# Patient Record
Sex: MALE | Race: WHITE | NOT HISPANIC OR LATINO | Employment: FULL TIME | URBAN - METROPOLITAN AREA
[De-identification: names, ages, dates, MRNs, and addresses within clinical notes are randomized per-mention and may not be internally consistent; named-entity substitution may affect disease eponyms.]

---

## 2023-04-10 ENCOUNTER — LAB (OUTPATIENT)
Dept: LAB | Facility: LAB | Age: 63
End: 2023-04-10
Payer: COMMERCIAL

## 2023-04-10 ENCOUNTER — OFFICE VISIT (OUTPATIENT)
Dept: PRIMARY CARE | Facility: CLINIC | Age: 63
End: 2023-04-10
Payer: COMMERCIAL

## 2023-04-10 VITALS
SYSTOLIC BLOOD PRESSURE: 140 MMHG | RESPIRATION RATE: 16 BRPM | WEIGHT: 275.8 LBS | TEMPERATURE: 97.5 F | OXYGEN SATURATION: 95 % | DIASTOLIC BLOOD PRESSURE: 76 MMHG | BODY MASS INDEX: 41.94 KG/M2 | HEART RATE: 69 BPM

## 2023-04-10 DIAGNOSIS — R33.8 BENIGN PROSTATIC HYPERPLASIA WITH URINARY RETENTION: ICD-10-CM

## 2023-04-10 DIAGNOSIS — I10 BENIGN ESSENTIAL HYPERTENSION: Primary | ICD-10-CM

## 2023-04-10 DIAGNOSIS — N40.1 BENIGN PROSTATIC HYPERPLASIA WITH URINARY RETENTION: ICD-10-CM

## 2023-04-10 DIAGNOSIS — E11.9 TYPE 2 DIABETES MELLITUS WITHOUT COMPLICATION, WITHOUT LONG-TERM CURRENT USE OF INSULIN (MULTI): ICD-10-CM

## 2023-04-10 PROBLEM — Z86.72 HISTORY OF DEEP VEIN THROMBOPHLEBITIS OF LOWER EXTREMITY: Status: ACTIVE | Noted: 2023-04-10

## 2023-04-10 PROBLEM — N52.9 MALE ERECTILE DISORDER: Status: ACTIVE | Noted: 2023-04-10

## 2023-04-10 PROBLEM — E66.01 MORBID OBESITY WITH BMI OF 40.0-44.9, ADULT (MULTI): Status: ACTIVE | Noted: 2023-04-10

## 2023-04-10 PROBLEM — R73.09 ELEVATED GLUCOSE LEVEL: Status: ACTIVE | Noted: 2023-04-10

## 2023-04-10 PROBLEM — R39.198 OTHER DIFFICULTIES WITH MICTURITION: Status: ACTIVE | Noted: 2023-04-10

## 2023-04-10 PROBLEM — R00.2 PALPITATIONS: Status: ACTIVE | Noted: 2023-04-10

## 2023-04-10 LAB
POC HEMOGLOBIN A1C: 6.2 % (ref 4.2–6.5)
PROSTATE SPECIFIC AG (NG/ML) IN SER/PLAS: 1.72 NG/ML (ref 0–4)

## 2023-04-10 PROCEDURE — 83036 HEMOGLOBIN GLYCOSYLATED A1C: CPT | Performed by: INTERNAL MEDICINE

## 2023-04-10 PROCEDURE — 1036F TOBACCO NON-USER: CPT | Performed by: INTERNAL MEDICINE

## 2023-04-10 PROCEDURE — 84153 ASSAY OF PSA TOTAL: CPT

## 2023-04-10 PROCEDURE — 3077F SYST BP >= 140 MM HG: CPT | Performed by: INTERNAL MEDICINE

## 2023-04-10 PROCEDURE — 3078F DIAST BP <80 MM HG: CPT | Performed by: INTERNAL MEDICINE

## 2023-04-10 PROCEDURE — 36415 COLL VENOUS BLD VENIPUNCTURE: CPT

## 2023-04-10 PROCEDURE — 99214 OFFICE O/P EST MOD 30 MIN: CPT | Performed by: INTERNAL MEDICINE

## 2023-04-10 RX ORDER — CHOLECALCIFEROL (VITAMIN D3) 125 MCG
CAPSULE ORAL
COMMUNITY
Start: 2021-08-26

## 2023-04-10 RX ORDER — TAMSULOSIN HYDROCHLORIDE 0.4 MG/1
0.4 CAPSULE ORAL NIGHTLY
COMMUNITY

## 2023-04-10 RX ORDER — OLMESARTAN MEDOXOMIL AND HYDROCHLOROTHIAZIDE 40/25 40; 25 MG/1; MG/1
1 TABLET ORAL DAILY
COMMUNITY
End: 2023-04-13

## 2023-04-10 RX ORDER — ASCORBIC ACID 500 MG
2 TABLET ORAL DAILY
COMMUNITY
Start: 2018-08-09

## 2023-04-10 RX ORDER — METOPROLOL SUCCINATE 50 MG/1
50 TABLET, EXTENDED RELEASE ORAL NIGHTLY
COMMUNITY
End: 2023-04-13

## 2023-04-10 RX ORDER — METFORMIN HYDROCHLORIDE 500 MG/1
500 TABLET ORAL
COMMUNITY
End: 2023-04-17

## 2023-04-10 RX ORDER — ATORVASTATIN CALCIUM 10 MG/1
1 TABLET, FILM COATED ORAL DAILY
COMMUNITY
Start: 2022-10-07 | End: 2023-10-09 | Stop reason: SDUPTHER

## 2023-04-10 ASSESSMENT — PATIENT HEALTH QUESTIONNAIRE - PHQ9
SUM OF ALL RESPONSES TO PHQ9 QUESTIONS 1 AND 2: 0
1. LITTLE INTEREST OR PLEASURE IN DOING THINGS: NOT AT ALL
2. FEELING DOWN, DEPRESSED OR HOPELESS: NOT AT ALL

## 2023-04-10 ASSESSMENT — ENCOUNTER SYMPTOMS
ACTIVITY CHANGE: 0
APPETITE CHANGE: 0
SHORTNESS OF BREATH: 0
COUGH: 0
PALPITATIONS: 0

## 2023-04-10 NOTE — PROGRESS NOTES
The patient is here today for a follow up appointment.Subjective   Patient ID: Juan J Gold is a 63 y.o. male who presents for Follow-up.  Here for follow up  Saw Diabetes Educator 3 times  Weight is up 2-3 pounds  Not really exercising - has a treadmill that he does not enjoy    Going to [place] at the end of the month    Worried about PSA 3 - juan diego a test this month before seeing urology      Review of Systems   Constitutional:  Negative for activity change and appetite change.   Respiratory:  Negative for cough and shortness of breath.    Cardiovascular:  Negative for chest pain, palpitations and leg swelling.       Objective   Physical Exam  Vitals and nursing note reviewed.   Cardiovascular:      Rate and Rhythm: Normal rate and regular rhythm.      Heart sounds: Normal heart sounds.   Pulmonary:      Effort: Pulmonary effort is normal.      Breath sounds: Normal breath sounds.   Musculoskeletal:      Cervical back: Normal range of motion.   Neurological:      Mental Status: He is alert.       Assessment/Plan   Problem List Items Addressed This Visit       Benign essential hypertension - Primary    Current Assessment & Plan     A little higher today  Will monitor         Type 2 diabetes mellitus without complication, without long-term current use of insulin (CMS/[facility])    Current Assessment & Plan     Stable  Needs Prevnar 20 -  wants to hold off         Enlarged prostate with lower urinary tract symptoms (LUTS)    Current Assessment & Plan     Seeing Urology next month

## 2023-04-13 DIAGNOSIS — I10 BENIGN ESSENTIAL HYPERTENSION: ICD-10-CM

## 2023-04-13 RX ORDER — METOPROLOL SUCCINATE 50 MG/1
TABLET, EXTENDED RELEASE ORAL
Qty: 90 TABLET | Refills: 1 | Status: SHIPPED | OUTPATIENT
Start: 2023-04-13 | End: 2023-10-30 | Stop reason: SDUPTHER

## 2023-04-13 RX ORDER — OLMESARTAN MEDOXOMIL AND HYDROCHLOROTHIAZIDE 40/25 40; 25 MG/1; MG/1
TABLET ORAL
Qty: 90 TABLET | Refills: 1 | Status: SHIPPED | OUTPATIENT
Start: 2023-04-13 | End: 2023-10-30 | Stop reason: SDUPTHER

## 2023-04-17 DIAGNOSIS — E11.9 TYPE 2 DIABETES MELLITUS WITHOUT COMPLICATION, WITHOUT LONG-TERM CURRENT USE OF INSULIN (MULTI): Primary | ICD-10-CM

## 2023-04-17 RX ORDER — METFORMIN HYDROCHLORIDE 500 MG/1
TABLET ORAL
Qty: 180 TABLET | Refills: 0 | Status: SHIPPED | OUTPATIENT
Start: 2023-04-17 | End: 2023-07-31

## 2023-06-28 PROBLEM — E11.9 DIABETES MELLITUS (MULTI): Status: ACTIVE | Noted: 2023-06-28

## 2023-07-10 ENCOUNTER — OFFICE VISIT (OUTPATIENT)
Dept: PRIMARY CARE | Facility: CLINIC | Age: 63
End: 2023-07-10
Payer: COMMERCIAL

## 2023-07-10 VITALS
WEIGHT: 277.2 LBS | HEART RATE: 69 BPM | OXYGEN SATURATION: 94 % | BODY MASS INDEX: 42.15 KG/M2 | SYSTOLIC BLOOD PRESSURE: 138 MMHG | DIASTOLIC BLOOD PRESSURE: 70 MMHG | RESPIRATION RATE: 16 BRPM | TEMPERATURE: 97.1 F

## 2023-07-10 DIAGNOSIS — E66.01 MORBID OBESITY WITH BMI OF 40.0-44.9, ADULT (MULTI): ICD-10-CM

## 2023-07-10 DIAGNOSIS — E11.9 TYPE 2 DIABETES MELLITUS WITHOUT COMPLICATION, WITHOUT LONG-TERM CURRENT USE OF INSULIN (MULTI): ICD-10-CM

## 2023-07-10 DIAGNOSIS — I10 BENIGN ESSENTIAL HYPERTENSION: Primary | ICD-10-CM

## 2023-07-10 DIAGNOSIS — Z00.00 HEALTH MAINTENANCE EXAMINATION: ICD-10-CM

## 2023-07-10 DIAGNOSIS — Z00.00 HEALTHCARE MAINTENANCE: ICD-10-CM

## 2023-07-10 LAB — POC HEMOGLOBIN A1C: 6 % (ref 4.2–6.5)

## 2023-07-10 PROCEDURE — 1036F TOBACCO NON-USER: CPT | Performed by: INTERNAL MEDICINE

## 2023-07-10 PROCEDURE — 99214 OFFICE O/P EST MOD 30 MIN: CPT | Performed by: INTERNAL MEDICINE

## 2023-07-10 PROCEDURE — 3008F BODY MASS INDEX DOCD: CPT | Performed by: INTERNAL MEDICINE

## 2023-07-10 PROCEDURE — 3075F SYST BP GE 130 - 139MM HG: CPT | Performed by: INTERNAL MEDICINE

## 2023-07-10 PROCEDURE — 3078F DIAST BP <80 MM HG: CPT | Performed by: INTERNAL MEDICINE

## 2023-07-10 PROCEDURE — 83036 HEMOGLOBIN GLYCOSYLATED A1C: CPT | Performed by: INTERNAL MEDICINE

## 2023-07-10 ASSESSMENT — ENCOUNTER SYMPTOMS
ACTIVITY CHANGE: 0
COUGH: 0
SHORTNESS OF BREATH: 0
APPETITE CHANGE: 0
PALPITATIONS: 0

## 2023-07-10 NOTE — PROGRESS NOTES
The patient is here today for a follow up appointment.Subjective   Patient ID: Juan J Gold is a 63 y.o. male who presents for Follow-up.  Here for follow up  Was on vacation for 3 weeks   Only gained 2 lbs    Has improved his diet  Making better choices    Had a colonoscopy few weeks ago    Granddaughter getting  this weekend in [place]          Review of Systems   Constitutional:  Negative for activity change and appetite change.   Respiratory:  Negative for cough and shortness of breath.    Cardiovascular:  Negative for chest pain, palpitations and leg swelling.       Objective   Physical Exam  Vitals and nursing note reviewed.   Cardiovascular:      Rate and Rhythm: Normal rate and regular rhythm.      Heart sounds: Normal heart sounds.   Pulmonary:      Effort: Pulmonary effort is normal.      Breath sounds: Normal breath sounds.   Musculoskeletal:      Cervical back: Normal range of motion.   Neurological:      Mental Status: He is alert.         Assessment/Plan   Problem List Items Addressed This Visit       Benign essential hypertension - Primary    Current Assessment & Plan     stable         Type 2 diabetes mellitus without complication, without long-term current use of insulin (CMS/[facility])    Current Assessment & Plan     Improved         Morbid obesity with BMI of 40.0-44.9, adult (CMS/[facility])    Current Assessment & Plan     Encouraged to refocus         Follow up with me in 3 months

## 2023-07-29 DIAGNOSIS — E11.9 TYPE 2 DIABETES MELLITUS WITHOUT COMPLICATION, WITHOUT LONG-TERM CURRENT USE OF INSULIN (MULTI): ICD-10-CM

## 2023-07-31 RX ORDER — METFORMIN HYDROCHLORIDE 500 MG/1
500 TABLET ORAL
Qty: 180 TABLET | Refills: 0 | Status: SHIPPED | OUTPATIENT
Start: 2023-07-31 | End: 2023-10-30 | Stop reason: SDUPTHER

## 2023-10-09 DIAGNOSIS — E11.9 TYPE 2 DIABETES MELLITUS WITHOUT COMPLICATION, WITHOUT LONG-TERM CURRENT USE OF INSULIN (MULTI): ICD-10-CM

## 2023-10-09 RX ORDER — ATORVASTATIN CALCIUM 10 MG/1
10 TABLET, FILM COATED ORAL DAILY
Qty: 90 TABLET | Refills: 1 | Status: SHIPPED | OUTPATIENT
Start: 2023-10-09 | End: 2024-04-05

## 2023-10-28 ENCOUNTER — LAB (OUTPATIENT)
Dept: LAB | Facility: LAB | Age: 63
End: 2023-10-28
Payer: COMMERCIAL

## 2023-10-28 DIAGNOSIS — Z00.00 HEALTHCARE MAINTENANCE: ICD-10-CM

## 2023-10-28 DIAGNOSIS — E11.9 TYPE 2 DIABETES MELLITUS WITHOUT COMPLICATION, WITHOUT LONG-TERM CURRENT USE OF INSULIN (MULTI): ICD-10-CM

## 2023-10-28 DIAGNOSIS — Z00.00 HEALTH MAINTENANCE EXAMINATION: ICD-10-CM

## 2023-10-28 LAB
ALBUMIN SERPL BCP-MCNC: 4 G/DL (ref 3.4–5)
ALP SERPL-CCNC: 100 U/L (ref 33–136)
ALT SERPL W P-5'-P-CCNC: 18 U/L (ref 10–52)
ANION GAP SERPL CALC-SCNC: 12 MMOL/L (ref 10–20)
AST SERPL W P-5'-P-CCNC: 16 U/L (ref 9–39)
BILIRUB SERPL-MCNC: 0.7 MG/DL (ref 0–1.2)
BUN SERPL-MCNC: 25 MG/DL (ref 6–23)
CALCIUM SERPL-MCNC: 8.9 MG/DL (ref 8.6–10.3)
CHLORIDE SERPL-SCNC: 103 MMOL/L (ref 98–107)
CHOLEST SERPL-MCNC: 125 MG/DL (ref 0–199)
CHOLESTEROL/HDL RATIO: 2.8
CO2 SERPL-SCNC: 29 MMOL/L (ref 21–32)
CREAT SERPL-MCNC: 1.07 MG/DL (ref 0.5–1.3)
ERYTHROCYTE [DISTWIDTH] IN BLOOD BY AUTOMATED COUNT: 12.2 % (ref 11.5–14.5)
EST. AVERAGE GLUCOSE BLD GHB EST-MCNC: 134 MG/DL
GFR SERPL CREATININE-BSD FRML MDRD: 78 ML/MIN/1.73M*2
GLUCOSE SERPL-MCNC: 131 MG/DL (ref 74–99)
HBA1C MFR BLD: 6.3 %
HCT VFR BLD AUTO: 43.9 % (ref 41–52)
HDLC SERPL-MCNC: 45.3 MG/DL
HGB BLD-MCNC: 15 G/DL (ref 13.5–17.5)
LDLC SERPL CALC-MCNC: 65 MG/DL
MCH RBC QN AUTO: 33.3 PG (ref 26–34)
MCHC RBC AUTO-ENTMCNC: 34.2 G/DL (ref 32–36)
MCV RBC AUTO: 97 FL (ref 80–100)
NON HDL CHOLESTEROL: 80 MG/DL (ref 0–149)
NRBC BLD-RTO: 0 /100 WBCS (ref 0–0)
PLATELET # BLD AUTO: 263 X10*3/UL (ref 150–450)
PMV BLD AUTO: 9.6 FL (ref 7.5–11.5)
POTASSIUM SERPL-SCNC: 4.2 MMOL/L (ref 3.5–5.3)
PROT SERPL-MCNC: 6.5 G/DL (ref 6.4–8.2)
RBC # BLD AUTO: 4.51 X10*6/UL (ref 4.5–5.9)
SODIUM SERPL-SCNC: 140 MMOL/L (ref 136–145)
TRIGL SERPL-MCNC: 75 MG/DL (ref 0–149)
TSH SERPL-ACNC: 2.23 MIU/L (ref 0.44–3.98)
VLDL: 15 MG/DL (ref 0–40)
WBC # BLD AUTO: 7.2 X10*3/UL (ref 4.4–11.3)

## 2023-10-28 PROCEDURE — 80053 COMPREHEN METABOLIC PANEL: CPT

## 2023-10-28 PROCEDURE — 84443 ASSAY THYROID STIM HORMONE: CPT

## 2023-10-28 PROCEDURE — 83036 HEMOGLOBIN GLYCOSYLATED A1C: CPT

## 2023-10-28 PROCEDURE — 85027 COMPLETE CBC AUTOMATED: CPT

## 2023-10-28 PROCEDURE — 36415 COLL VENOUS BLD VENIPUNCTURE: CPT

## 2023-10-28 PROCEDURE — 80061 LIPID PANEL: CPT

## 2023-10-30 ENCOUNTER — OFFICE VISIT (OUTPATIENT)
Dept: PRIMARY CARE | Facility: CLINIC | Age: 63
End: 2023-10-30
Payer: COMMERCIAL

## 2023-10-30 VITALS
SYSTOLIC BLOOD PRESSURE: 137 MMHG | OXYGEN SATURATION: 95 % | BODY MASS INDEX: 42.25 KG/M2 | HEIGHT: 68 IN | TEMPERATURE: 97.2 F | WEIGHT: 278.8 LBS | HEART RATE: 63 BPM | DIASTOLIC BLOOD PRESSURE: 75 MMHG | RESPIRATION RATE: 16 BRPM

## 2023-10-30 DIAGNOSIS — Z23 ENCOUNTER FOR IMMUNIZATION: ICD-10-CM

## 2023-10-30 DIAGNOSIS — Z00.00 HEALTH CARE MAINTENANCE: ICD-10-CM

## 2023-10-30 DIAGNOSIS — I10 BENIGN ESSENTIAL HYPERTENSION: ICD-10-CM

## 2023-10-30 DIAGNOSIS — E11.9 TYPE 2 DIABETES MELLITUS WITHOUT COMPLICATION, WITHOUT LONG-TERM CURRENT USE OF INSULIN (MULTI): ICD-10-CM

## 2023-10-30 DIAGNOSIS — E66.01 MORBID OBESITY WITH BMI OF 40.0-44.9, ADULT (MULTI): Primary | ICD-10-CM

## 2023-10-30 PROCEDURE — 99396 PREV VISIT EST AGE 40-64: CPT | Performed by: INTERNAL MEDICINE

## 2023-10-30 PROCEDURE — 3075F SYST BP GE 130 - 139MM HG: CPT | Performed by: INTERNAL MEDICINE

## 2023-10-30 PROCEDURE — 3078F DIAST BP <80 MM HG: CPT | Performed by: INTERNAL MEDICINE

## 2023-10-30 PROCEDURE — 90686 IIV4 VACC NO PRSV 0.5 ML IM: CPT | Performed by: INTERNAL MEDICINE

## 2023-10-30 PROCEDURE — 3044F HG A1C LEVEL LT 7.0%: CPT | Performed by: INTERNAL MEDICINE

## 2023-10-30 PROCEDURE — 1036F TOBACCO NON-USER: CPT | Performed by: INTERNAL MEDICINE

## 2023-10-30 PROCEDURE — 3048F LDL-C <100 MG/DL: CPT | Performed by: INTERNAL MEDICINE

## 2023-10-30 PROCEDURE — 93000 ELECTROCARDIOGRAM COMPLETE: CPT | Performed by: INTERNAL MEDICINE

## 2023-10-30 PROCEDURE — 3008F BODY MASS INDEX DOCD: CPT | Performed by: INTERNAL MEDICINE

## 2023-10-30 PROCEDURE — 90471 IMMUNIZATION ADMIN: CPT | Performed by: INTERNAL MEDICINE

## 2023-10-30 RX ORDER — OLMESARTAN MEDOXOMIL AND HYDROCHLOROTHIAZIDE 40/25 40; 25 MG/1; MG/1
1 TABLET ORAL DAILY
Qty: 90 TABLET | Refills: 1 | Status: SHIPPED | OUTPATIENT
Start: 2023-10-30 | End: 2024-04-24

## 2023-10-30 RX ORDER — METFORMIN HYDROCHLORIDE 500 MG/1
500 TABLET ORAL
Qty: 180 TABLET | Refills: 1 | Status: SHIPPED | OUTPATIENT
Start: 2023-10-30 | End: 2024-04-24

## 2023-10-30 RX ORDER — METOPROLOL SUCCINATE 50 MG/1
50 TABLET, EXTENDED RELEASE ORAL NIGHTLY
Qty: 90 TABLET | Refills: 1 | Status: SHIPPED | OUTPATIENT
Start: 2023-10-30 | End: 2024-04-24

## 2023-10-30 ASSESSMENT — ENCOUNTER SYMPTOMS
SORE THROAT: 0
APPETITE CHANGE: 0
ACTIVITY CHANGE: 0
DYSURIA: 0
CONSTIPATION: 0
DIARRHEA: 0
HEADACHES: 0
TREMORS: 0
ABDOMINAL PAIN: 0
BLOOD IN STOOL: 0
RHINORRHEA: 0
TROUBLE SWALLOWING: 0
VOICE CHANGE: 0
UNEXPECTED WEIGHT CHANGE: 0
DIZZINESS: 0
ARTHRALGIAS: 0
FATIGUE: 0
SHORTNESS OF BREATH: 0
CHEST TIGHTNESS: 0
COUGH: 0
PALPITATIONS: 0

## 2023-10-30 NOTE — ASSESSMENT & PLAN NOTE
Discussed Mounjaro - not interested in injectibles  Continue Metformin  Wants to work on lifestyle

## 2023-10-30 NOTE — PROGRESS NOTES
The patient is here today for a physical exam.Subjective   Patient ID: Juan J Gold is a 63 y.o. male who presents for Annual Exam.  62 yo male here for a physical  Feels well  No complaints    Has been working more - not eating well           Review of Systems   Constitutional:  Negative for activity change, appetite change, fatigue and unexpected weight change.   HENT:  Negative for ear pain, hearing loss, rhinorrhea, sore throat, trouble swallowing and voice change.    Eyes:  Negative for visual disturbance.   Respiratory:  Negative for cough, chest tightness and shortness of breath.    Cardiovascular:  Negative for chest pain, palpitations and leg swelling.   Gastrointestinal:  Negative for abdominal pain, blood in stool, constipation and diarrhea.   Genitourinary:  Negative for dysuria, scrotal swelling and testicular pain.   Musculoskeletal:  Negative for arthralgias.   Skin:  Negative for rash.   Neurological:  Negative for dizziness, tremors, syncope and headaches.       Objective   Physical Exam  Constitutional:       General: He is not in acute distress.     Appearance: He is well-developed. He is not diaphoretic.   HENT:      Head: Normocephalic.      Right Ear: Tympanic membrane normal. There is no impacted cerumen.      Left Ear: Tympanic membrane normal. There is no impacted cerumen.      Nose: Nose normal.      Mouth/Throat:      Mouth: Mucous membranes are moist.      Pharynx: Oropharynx is clear. No oropharyngeal exudate or posterior oropharyngeal erythema.   Eyes:      General: No scleral icterus.     Extraocular Movements: Extraocular movements intact.      Conjunctiva/sclera: Conjunctivae normal.      Pupils: Pupils are equal, round, and reactive to light.   Neck:      Thyroid: No thyromegaly.      Vascular: No JVD.   Cardiovascular:      Rate and Rhythm: Normal rate and regular rhythm.      Pulses: Normal pulses.      Heart sounds: Normal heart sounds. No murmur heard.     No friction rub. No  gallop.   Pulmonary:      Effort: Pulmonary effort is normal. No respiratory distress.      Breath sounds: Normal breath sounds. No wheezing or rales.   Chest:      Chest wall: No tenderness.   Abdominal:      General: Bowel sounds are normal. There is no distension.      Palpations: Abdomen is soft. There is no mass.      Tenderness: There is no abdominal tenderness. There is no rebound.   Musculoskeletal:         General: Normal range of motion.      Cervical back: Normal range of motion and neck supple.   Lymphadenopathy:      Cervical: No cervical adenopathy.   Skin:     General: Skin is warm and dry.   Neurological:      General: No focal deficit present.      Mental Status: He is alert and oriented to person, place, and time.      Deep Tendon Reflexes: Reflexes normal.   Psychiatric:         Mood and Affect: Mood normal.         Thought Content: Thought content normal.         Assessment/Plan   Problem List Items Addressed This Visit       Benign essential hypertension    Current Assessment & Plan     Stable         Relevant Medications    metoprolol succinate XL (Toprol-XL) 50 mg 24 hr tablet    olmesartan-hydrochlorothiazide (BENIcar HCT) 40-25 mg tablet    Type 2 diabetes mellitus without complication, without long-term current use of insulin (CMS/[facility])    Current Assessment & Plan     Discussed Mounjaro - not interested in injectibles  Continue Metformin  Wants to work on lifestyle         Relevant Medications    metFORMIN (Glucophage) 500 mg tablet    Morbid obesity with BMI of 40.0-44.9, adult (CMS/[facility]) - Primary    Current Assessment & Plan     Discussed diet and exercise          Other Visit Diagnoses       Encounter for immunization        Relevant Orders    Flu vaccine (IIV4) age 6 months and greater, preservative free (Completed)    Health care maintenance        Relevant Orders    ECG 12 lead (Clinic Performed) (Completed)         Recommend shingrix - will think about it

## 2023-11-20 RX ORDER — TAMSULOSIN HYDROCHLORIDE 0.4 MG/1
0.4 CAPSULE ORAL NIGHTLY
Qty: 90 CAPSULE | Refills: 0 | OUTPATIENT
Start: 2023-11-20

## 2024-02-08 ENCOUNTER — APPOINTMENT (OUTPATIENT)
Dept: PRIMARY CARE | Facility: CLINIC | Age: 64
End: 2024-02-08
Payer: COMMERCIAL

## 2024-03-26 ENCOUNTER — TELEPHONE (OUTPATIENT)
Dept: UROLOGY | Facility: CLINIC | Age: 64
End: 2024-03-26
Payer: COMMERCIAL

## 2024-03-26 DIAGNOSIS — R35.0 BENIGN PROSTATIC HYPERPLASIA WITH URINARY FREQUENCY: Primary | ICD-10-CM

## 2024-03-26 DIAGNOSIS — N40.1 BENIGN PROSTATIC HYPERPLASIA WITH URINARY FREQUENCY: Primary | ICD-10-CM

## 2024-03-26 RX ORDER — TADALAFIL 5 MG/1
5 TABLET ORAL DAILY
Qty: 90 TABLET | Refills: 3 | Status: SHIPPED | OUTPATIENT
Start: 2024-03-26 | End: 2025-03-26

## 2024-04-05 DIAGNOSIS — E11.9 TYPE 2 DIABETES MELLITUS WITHOUT COMPLICATION, WITHOUT LONG-TERM CURRENT USE OF INSULIN (MULTI): ICD-10-CM

## 2024-04-05 RX ORDER — ATORVASTATIN CALCIUM 10 MG/1
10 TABLET, FILM COATED ORAL DAILY
Qty: 90 TABLET | Refills: 0 | Status: SHIPPED | OUTPATIENT
Start: 2024-04-05

## 2024-04-24 DIAGNOSIS — I10 BENIGN ESSENTIAL HYPERTENSION: ICD-10-CM

## 2024-04-24 DIAGNOSIS — E11.9 TYPE 2 DIABETES MELLITUS WITHOUT COMPLICATION, WITHOUT LONG-TERM CURRENT USE OF INSULIN (MULTI): ICD-10-CM

## 2024-04-24 RX ORDER — METFORMIN HYDROCHLORIDE 500 MG/1
500 TABLET ORAL
Qty: 180 TABLET | Refills: 0 | Status: SHIPPED | OUTPATIENT
Start: 2024-04-24

## 2024-04-24 RX ORDER — OLMESARTAN MEDOXOMIL AND HYDROCHLOROTHIAZIDE 40/25 40; 25 MG/1; MG/1
1 TABLET ORAL DAILY
Qty: 90 TABLET | Refills: 0 | Status: SHIPPED | OUTPATIENT
Start: 2024-04-24

## 2024-04-24 RX ORDER — METOPROLOL SUCCINATE 50 MG/1
50 TABLET, EXTENDED RELEASE ORAL DAILY
Qty: 90 TABLET | Refills: 0 | Status: SHIPPED | OUTPATIENT
Start: 2024-04-24

## 2024-05-08 ENCOUNTER — OFFICE VISIT (OUTPATIENT)
Dept: PRIMARY CARE | Facility: CLINIC | Age: 64
End: 2024-05-08
Payer: COMMERCIAL

## 2024-05-08 VITALS
HEIGHT: 68 IN | OXYGEN SATURATION: 95 % | TEMPERATURE: 97.2 F | BODY MASS INDEX: 42.86 KG/M2 | HEART RATE: 66 BPM | SYSTOLIC BLOOD PRESSURE: 132 MMHG | DIASTOLIC BLOOD PRESSURE: 78 MMHG | WEIGHT: 282.8 LBS

## 2024-05-08 DIAGNOSIS — I10 BENIGN ESSENTIAL HYPERTENSION: Primary | ICD-10-CM

## 2024-05-08 DIAGNOSIS — E11.9 TYPE 2 DIABETES MELLITUS WITHOUT COMPLICATION, WITHOUT LONG-TERM CURRENT USE OF INSULIN (MULTI): ICD-10-CM

## 2024-05-08 DIAGNOSIS — Z00.00 ROUTINE GENERAL MEDICAL EXAMINATION AT A HEALTH CARE FACILITY: ICD-10-CM

## 2024-05-08 DIAGNOSIS — E66.01 MORBID OBESITY WITH BMI OF 40.0-44.9, ADULT (MULTI): ICD-10-CM

## 2024-05-08 LAB — POC HEMOGLOBIN A1C: 6.7 % (ref 4.2–6.5)

## 2024-05-08 PROCEDURE — 3078F DIAST BP <80 MM HG: CPT | Performed by: INTERNAL MEDICINE

## 2024-05-08 PROCEDURE — 83036 HEMOGLOBIN GLYCOSYLATED A1C: CPT | Performed by: INTERNAL MEDICINE

## 2024-05-08 PROCEDURE — 3075F SYST BP GE 130 - 139MM HG: CPT | Performed by: INTERNAL MEDICINE

## 2024-05-08 PROCEDURE — 3008F BODY MASS INDEX DOCD: CPT | Performed by: INTERNAL MEDICINE

## 2024-05-08 PROCEDURE — 99214 OFFICE O/P EST MOD 30 MIN: CPT | Performed by: INTERNAL MEDICINE

## 2024-05-08 PROCEDURE — 1036F TOBACCO NON-USER: CPT | Performed by: INTERNAL MEDICINE

## 2024-05-08 ASSESSMENT — ENCOUNTER SYMPTOMS
MUSCULOSKELETAL NEGATIVE: 1
RESPIRATORY NEGATIVE: 1
CONSTITUTIONAL NEGATIVE: 1
CARDIOVASCULAR NEGATIVE: 1
GASTROINTESTINAL NEGATIVE: 1

## 2024-05-08 ASSESSMENT — PATIENT HEALTH QUESTIONNAIRE - PHQ9
2. FEELING DOWN, DEPRESSED OR HOPELESS: NOT AT ALL
1. LITTLE INTEREST OR PLEASURE IN DOING THINGS: NOT AT ALL
SUM OF ALL RESPONSES TO PHQ9 QUESTIONS 1 AND 2: 0

## 2024-05-08 NOTE — ASSESSMENT & PLAN NOTE
A little high today  Monitor for now  
Discussed diet and exercise     
Wants to continue Metformin for now  Consider Rybelsus (against injectable)  
Detail Level: Zone
Additional Notes: Lesions of concern on the trunk, clinically consistent with seborrheic keratoses.

## 2024-05-08 NOTE — PROGRESS NOTES
"Subjective   Patient ID: Omar Gold \"Juan J\" is a 64 y.o. male who presents for Follow-up.  Here for follow up  Fell in Jan on ice - had a strained back and concussion  Had PT  Went back to work one week later  During this fell off the diet and exercise routine  Aggravated that this had happened    Taking all his medications        Review of Systems   Constitutional: Negative.    HENT: Negative.     Respiratory: Negative.     Cardiovascular: Negative.    Gastrointestinal: Negative.    Genitourinary: Negative.    Musculoskeletal: Negative.        Objective   Vitals:    05/08/24 1638   BP: 132/78   Pulse:    Temp:    SpO2:      Physical Exam  Vitals and nursing note reviewed.   Cardiovascular:      Rate and Rhythm: Normal rate and regular rhythm.      Heart sounds: Normal heart sounds.   Pulmonary:      Effort: Pulmonary effort is normal.      Breath sounds: Normal breath sounds.   Musculoskeletal:      Cervical back: Normal range of motion.   Neurological:      Mental Status: He is alert.         Assessment/Plan   Problem List Items Addressed This Visit       Benign essential hypertension - Primary    Current Assessment & Plan     A little high today  Monitor for now         Type 2 diabetes mellitus without complication, without long-term current use of insulin (Multi)    Current Assessment & Plan     Wants to continue Metformin for now  Consider Rybelsus (against injectable)         Relevant Orders    POCT glycosylated hemoglobin (Hb A1C) manually resulted (Completed)    Hemoglobin A1C    Morbid obesity with BMI of 40.0-44.9, adult (Multi)    Current Assessment & Plan     Discussed diet and exercise             Other Visit Diagnoses       Routine general medical examination at a health care facility        Relevant Orders    CBC    Comprehensive Metabolic Panel    Lipid Panel    TSH with reflex to Free T4 if abnormal         Follow up with me in 3 months  "

## 2024-06-01 ENCOUNTER — TELEPHONE (OUTPATIENT)
Dept: UROLOGY | Facility: HOSPITAL | Age: 64
End: 2024-06-01
Payer: COMMERCIAL

## 2024-06-01 DIAGNOSIS — R31.0 GROSS HEMATURIA: Primary | ICD-10-CM

## 2024-06-01 NOTE — TELEPHONE ENCOUNTER
65yo male w/PMH BPH with LUTS c/b bladder stones s/p TURP and cystolitholapaxy in 2010 who called in to Urology emergency line regarding new onset painless gross hematuria x2 after yard work today. First void was red and next was lighter brown color. Patient with no flank pain, fever, chills, or other issues, emptying bladder at baseline, no clots observed.    Plan:  -Discussed ED precautions if patient unable to void or severe hematuria  -Ucx ordered, patient will obtain Monday  -Appointment request with SUZI godinez for follow up within the next 2 weeks.  -If Ucx negative for infection or UA +micorhematuria/recurrence of gross hematuria after treatment of infection, recommend CT Urogram and in office cystoscopy per AUA hematuria guidelines.    Jenifer Donohue MD  PGY3 Urology

## 2024-06-03 ENCOUNTER — LAB (OUTPATIENT)
Dept: LAB | Facility: LAB | Age: 64
End: 2024-06-03
Payer: COMMERCIAL

## 2024-06-03 DIAGNOSIS — R31.0 GROSS HEMATURIA: ICD-10-CM

## 2024-06-03 DIAGNOSIS — R31.0 GROSS HEMATURIA: Primary | ICD-10-CM

## 2024-06-03 PROCEDURE — 87086 URINE CULTURE/COLONY COUNT: CPT

## 2024-06-04 LAB — BACTERIA UR CULT: NO GROWTH

## 2024-06-06 ENCOUNTER — OFFICE VISIT (OUTPATIENT)
Dept: UROLOGY | Facility: CLINIC | Age: 64
End: 2024-06-06
Payer: COMMERCIAL

## 2024-06-06 VITALS — TEMPERATURE: 97.1 F | DIASTOLIC BLOOD PRESSURE: 74 MMHG | SYSTOLIC BLOOD PRESSURE: 157 MMHG | HEART RATE: 69 BPM

## 2024-06-06 DIAGNOSIS — N13.8 BPH WITH OBSTRUCTION/LOWER URINARY TRACT SYMPTOMS: ICD-10-CM

## 2024-06-06 DIAGNOSIS — R31.0 GROSS HEMATURIA: Primary | ICD-10-CM

## 2024-06-06 DIAGNOSIS — N40.1 BPH WITH OBSTRUCTION/LOWER URINARY TRACT SYMPTOMS: ICD-10-CM

## 2024-06-06 LAB
POC APPEARANCE, URINE: CLEAR
POC BILIRUBIN, URINE: NEGATIVE
POC BLOOD, URINE: ABNORMAL
POC COLOR, URINE: YELLOW
POC GLUCOSE, URINE: NEGATIVE MG/DL
POC KETONES, URINE: NEGATIVE MG/DL
POC LEUKOCYTES, URINE: NEGATIVE
POC NITRITE,URINE: NEGATIVE
POC PH, URINE: 5.5 PH
POC PROTEIN, URINE: ABNORMAL MG/DL
POC SPECIFIC GRAVITY, URINE: 1.02
POC UROBILINOGEN, URINE: 0.2 EU/DL

## 2024-06-06 PROCEDURE — 3077F SYST BP >= 140 MM HG: CPT | Performed by: NURSE PRACTITIONER

## 2024-06-06 PROCEDURE — G2211 COMPLEX E/M VISIT ADD ON: HCPCS | Performed by: NURSE PRACTITIONER

## 2024-06-06 PROCEDURE — 99214 OFFICE O/P EST MOD 30 MIN: CPT | Performed by: NURSE PRACTITIONER

## 2024-06-06 PROCEDURE — 88112 CYTOPATH CELL ENHANCE TECH: CPT

## 2024-06-06 PROCEDURE — 1036F TOBACCO NON-USER: CPT | Performed by: NURSE PRACTITIONER

## 2024-06-06 PROCEDURE — 3078F DIAST BP <80 MM HG: CPT | Performed by: NURSE PRACTITIONER

## 2024-06-06 PROCEDURE — 3008F BODY MASS INDEX DOCD: CPT | Performed by: NURSE PRACTITIONER

## 2024-06-06 PROCEDURE — 81003 URINALYSIS AUTO W/O SCOPE: CPT | Performed by: NURSE PRACTITIONER

## 2024-06-06 PROCEDURE — 81003 URINALYSIS AUTO W/O SCOPE: CPT

## 2024-06-06 NOTE — PROGRESS NOTES
"Subjective   Patient ID: Omar Gold \"Juan J\" is a 64 y.o. male who presents for Hematuria Follow Up .  Presents for eval of gross hematuria. Previously seen in Aug 2023 for BPH with LUTS.He states he has occasional urgency and straining with frequency as well as a weak stream. Denies urgency. NTF remains stable 1-2. Occasional worsening of LUTS. Denies history of dysuria and gross hematuria. Drinks close to 100 oz of liquid daily, minimal caffeine.      History of bladder stones, treated in 2010 as well as TURP in 2010. Previously on tamsulosin, switched to tadalafil 5mg in Aug 2024.      Minor ED symptoms at present. Occasional spontaneous erections, poor quality.      PSA 1.7 in April 2023, decreased from 3.1 in fall 2022.      Developed painless gross hematuria on 6/1. States he received a new bottle of tadalafil on 5/28 with slight increase in nocturia up to 2-3, previously 1. States gross hematuria occurred on 6/1 after yard work, did not feel this was particularly strenuous. States it resolved over the next day. Historically notes very small amount of pink urine in Jan after he was riding a bike.           Review of Systems   All other systems reviewed and are negative.      Objective   Physical Exam  Vitals reviewed.     Alert and oriented x3  Moist mucous membranes  Breathes easily on room air  Abdomen soft, nondistended. Obese  No edema  No scleral icterus  No focal neurological deficits  Appears stated age, no acute distress    Office Visit on 06/06/2024   Component Date Value Ref Range Status    POC Color, Urine 06/06/2024 Yellow  Straw, Yellow, Light-Yellow Final    POC Appearance, Urine 06/06/2024 Clear  Clear Final    POC Glucose, Urine 06/06/2024 NEGATIVE  NEGATIVE mg/dl Final    POC Bilirubin, Urine 06/06/2024 NEGATIVE  NEGATIVE Final    POC Ketones, Urine 06/06/2024 NEGATIVE  NEGATIVE mg/dl Final    POC Specific Gravity, Urine 06/06/2024 1.020  1.005 - 1.035 Final    POC Blood, Urine 06/06/2024 " TRACE-Intact (A)  NEGATIVE Final    POC PH, Urine 06/06/2024 5.5  No Reference Range Established PH Final    POC Protein, Urine 06/06/2024 TRACE (A)  NEGATIVE, 30 (1+) mg/dl Final    POC Urobilinogen, Urine 06/06/2024 0.2  0.2, 1.0 EU/DL Final    Poc Nitrite, Urine 06/06/2024 NEGATIVE  NEGATIVE Final    POC Leukocytes, Urine 06/06/2024 NEGATIVE  NEGATIVE Final         Assessment/Plan   Diagnoses and all orders for this visit:  Gross hematuria  -     POCT UA Automated manually resulted  -     Non-gynecologic cytology; Future  -     Basic metabolic panel; Future  -     CT urography w 3D volume rendered imaging; Future  -     Cystourethroscopy; Future  BPH with obstruction/lower urinary tract symptoms  -     Basic metabolic panel; Future    Presents for eval of gross hematuria. Reviewed AUA guidelines for eval including urine cytology, CTU, and cystoscopy. Patient in agreement with plan and will be scheduled accordingly.          Aniyah Bell, SUZI-CNP 06/06/24 2:03 PM

## 2024-06-07 LAB
APPEARANCE UR: CLEAR
BILIRUB UR STRIP.AUTO-MCNC: NEGATIVE MG/DL
COLOR UR: NORMAL
GLUCOSE UR STRIP.AUTO-MCNC: NORMAL MG/DL
KETONES UR STRIP.AUTO-MCNC: NEGATIVE MG/DL
LEUKOCYTE ESTERASE UR QL STRIP.AUTO: NEGATIVE
NITRITE UR QL STRIP.AUTO: NEGATIVE
PH UR STRIP.AUTO: 5.5 [PH]
PROT UR STRIP.AUTO-MCNC: NEGATIVE MG/DL
RBC # UR STRIP.AUTO: NEGATIVE /UL
SP GR UR STRIP.AUTO: 1.02
UROBILINOGEN UR STRIP.AUTO-MCNC: NORMAL MG/DL

## 2024-06-10 LAB
LABORATORY COMMENT REPORT: NORMAL
LABORATORY COMMENT REPORT: NORMAL
PATH REPORT.FINAL DX SPEC: NORMAL
PATH REPORT.GROSS SPEC: NORMAL
PATH REPORT.RELEVANT HX SPEC: NORMAL
PATH REPORT.TOTAL CANCER: NORMAL

## 2024-06-25 ENCOUNTER — APPOINTMENT (OUTPATIENT)
Dept: RADIOLOGY | Facility: CLINIC | Age: 64
End: 2024-06-25
Payer: COMMERCIAL

## 2024-07-03 ENCOUNTER — APPOINTMENT (OUTPATIENT)
Dept: UROLOGY | Facility: CLINIC | Age: 64
End: 2024-07-03
Payer: COMMERCIAL

## 2024-07-26 DIAGNOSIS — E11.9 TYPE 2 DIABETES MELLITUS WITHOUT COMPLICATION, WITHOUT LONG-TERM CURRENT USE OF INSULIN (MULTI): ICD-10-CM

## 2024-07-26 RX ORDER — METFORMIN HYDROCHLORIDE 500 MG/1
500 TABLET ORAL
Qty: 180 TABLET | Refills: 3 | Status: SHIPPED | OUTPATIENT
Start: 2024-07-26

## 2024-08-05 ENCOUNTER — HOSPITAL ENCOUNTER (OUTPATIENT)
Dept: CARDIOLOGY | Facility: HOSPITAL | Age: 64
Discharge: HOME | End: 2024-08-05
Payer: COMMERCIAL

## 2024-08-05 ENCOUNTER — OFFICE VISIT (OUTPATIENT)
Dept: PRIMARY CARE | Facility: CLINIC | Age: 64
End: 2024-08-05
Payer: COMMERCIAL

## 2024-08-05 VITALS
HEART RATE: 68 BPM | SYSTOLIC BLOOD PRESSURE: 144 MMHG | DIASTOLIC BLOOD PRESSURE: 73 MMHG | TEMPERATURE: 97.7 F | HEIGHT: 68 IN | WEIGHT: 280.6 LBS | OXYGEN SATURATION: 95 % | BODY MASS INDEX: 42.53 KG/M2

## 2024-08-05 DIAGNOSIS — M79.605 PAIN IN LEFT LEG: ICD-10-CM

## 2024-08-05 DIAGNOSIS — I10 BENIGN ESSENTIAL HYPERTENSION: ICD-10-CM

## 2024-08-05 DIAGNOSIS — M79.89 LEG SWELLING: Primary | ICD-10-CM

## 2024-08-05 DIAGNOSIS — E66.01 MORBID OBESITY WITH BMI OF 40.0-44.9, ADULT (MULTI): ICD-10-CM

## 2024-08-05 DIAGNOSIS — E11.9 TYPE 2 DIABETES MELLITUS WITHOUT COMPLICATION, WITHOUT LONG-TERM CURRENT USE OF INSULIN (MULTI): ICD-10-CM

## 2024-08-05 DIAGNOSIS — I82.4Y2 ACUTE DEEP VEIN THROMBOSIS (DVT) OF PROXIMAL VEIN OF LEFT LOWER EXTREMITY (MULTI): ICD-10-CM

## 2024-08-05 DIAGNOSIS — M79.89 LEG SWELLING: ICD-10-CM

## 2024-08-05 LAB — POC HEMOGLOBIN A1C: 5.1 % (ref 4.2–6.5)

## 2024-08-05 PROCEDURE — 3078F DIAST BP <80 MM HG: CPT | Performed by: INTERNAL MEDICINE

## 2024-08-05 PROCEDURE — 83036 HEMOGLOBIN GLYCOSYLATED A1C: CPT | Performed by: INTERNAL MEDICINE

## 2024-08-05 PROCEDURE — 1036F TOBACCO NON-USER: CPT | Performed by: INTERNAL MEDICINE

## 2024-08-05 PROCEDURE — 3008F BODY MASS INDEX DOCD: CPT | Performed by: INTERNAL MEDICINE

## 2024-08-05 PROCEDURE — 93971 EXTREMITY STUDY: CPT | Performed by: STUDENT IN AN ORGANIZED HEALTH CARE EDUCATION/TRAINING PROGRAM

## 2024-08-05 PROCEDURE — 99214 OFFICE O/P EST MOD 30 MIN: CPT | Performed by: INTERNAL MEDICINE

## 2024-08-05 PROCEDURE — 93971 EXTREMITY STUDY: CPT

## 2024-08-05 PROCEDURE — 3077F SYST BP >= 140 MM HG: CPT | Performed by: INTERNAL MEDICINE

## 2024-08-05 RX ORDER — APIXABAN 5 MG (74)
KIT ORAL
Qty: 74 TABLET | Refills: 0 | Status: SHIPPED | OUTPATIENT
Start: 2024-08-05

## 2024-08-05 RX ORDER — ATORVASTATIN CALCIUM 10 MG/1
10 TABLET, FILM COATED ORAL DAILY
Qty: 90 TABLET | Refills: 3 | Status: SHIPPED | OUTPATIENT
Start: 2024-08-05

## 2024-08-05 RX ORDER — METOPROLOL SUCCINATE 100 MG/1
100 TABLET, EXTENDED RELEASE ORAL DAILY
Qty: 90 TABLET | Refills: 3 | Status: SHIPPED | OUTPATIENT
Start: 2024-08-05 | End: 2025-08-05

## 2024-08-05 RX ORDER — METOPROLOL SUCCINATE 50 MG/1
50 TABLET, EXTENDED RELEASE ORAL DAILY
Qty: 90 TABLET | Refills: 3 | Status: SHIPPED | OUTPATIENT
Start: 2024-08-05

## 2024-08-05 RX ORDER — OLMESARTAN MEDOXOMIL AND HYDROCHLOROTHIAZIDE 40/25 40; 25 MG/1; MG/1
1 TABLET ORAL DAILY
Qty: 90 TABLET | Refills: 3 | Status: SHIPPED | OUTPATIENT
Start: 2024-08-05

## 2024-08-05 ASSESSMENT — PATIENT HEALTH QUESTIONNAIRE - PHQ9
1. LITTLE INTEREST OR PLEASURE IN DOING THINGS: NOT AT ALL
2. FEELING DOWN, DEPRESSED OR HOPELESS: NOT AT ALL
SUM OF ALL RESPONSES TO PHQ9 QUESTIONS 1 AND 2: 0

## 2024-08-05 NOTE — PROGRESS NOTES
"Subjective   Patient ID: Omar Gold \"Juan J\" is a 64 y.o. male who presents for Leg Pain.  HPI  Here for follow up  4 days of left posterior thigh pain  Was in [place] and started last week  The surface veins feel tender  Leg is swollen  Flew there  Had a DVT 14 years ago in the same leg    Was on vacation for 10 days - watching his diet when home    Review of Systems    Objective   Vitals:    08/05/24 1322   BP: 144/73   Pulse: 68   Temp: 36.5 °C (97.7 °F)   SpO2: 95%     Physical Exam  Vitals and nursing note reviewed.   Cardiovascular:      Rate and Rhythm: Normal rate and regular rhythm.      Heart sounds: Normal heart sounds.   Pulmonary:      Effort: Pulmonary effort is normal.      Breath sounds: Normal breath sounds.   Musculoskeletal:         General: Swelling present.      Cervical back: Normal range of motion.   Neurological:      Mental Status: He is alert.       LLE - tender left inner thigh  2+PE 1/3 up calf    Assessment & Plan  Benign essential hypertension  Increase Metoprolol  Continue Olmsartan    Orders:    metoprolol succinate XL (Toprol-XL) 50 mg 24 hr tablet; Take 1 tablet (50 mg) by mouth once daily.    olmesartan-hydrochlorothiazide (BENIcar HCT) 40-25 mg tablet; Take 1 tablet by mouth once daily.    metoprolol succinate XL (Toprol XL) 100 mg 24 hr tablet; Take 1 tablet (100 mg) by mouth once daily. Do not crush or chew.    Type 2 diabetes mellitus without complication, without long-term current use of insulin (Multi)  Stable  Orders:    atorvastatin (Lipitor) 10 mg tablet; Take 1 tablet (10 mg) by mouth once daily.    POCT glycosylated hemoglobin (Hb A1C) manually resulted    Leg swelling  Will formulate a plan once US available  Orders:    Lower extremity venous duplex left; Future    Morbid obesity with BMI of 40.0-44.9, adult (Multi)  Discussed diet and exercise          Follow up with me in 4-5 weeks  Addendum -   Received a call from [facility] -  positive for acute non-occlusive DVT " in LFV mid and distal thigh and Lpop V also has chronic DVT in LPTV. He has superficial thrombus in LGSV from proximal thigh extending into proximal calf and at distal LGSV   Will start Eliquis - he has been on it in the past  He will call back if he cannot afford the meds

## 2024-08-05 NOTE — ASSESSMENT & PLAN NOTE
Stable  Orders:    atorvastatin (Lipitor) 10 mg tablet; Take 1 tablet (10 mg) by mouth once daily.    POCT glycosylated hemoglobin (Hb A1C) manually resulted

## 2024-08-06 ENCOUNTER — TELEPHONE (OUTPATIENT)
Dept: PRIMARY CARE | Facility: CLINIC | Age: 64
End: 2024-08-06
Payer: COMMERCIAL

## 2024-08-06 NOTE — TELEPHONE ENCOUNTER
Pt was just seen yesterday 8/5. Pt called about ultrasound that he got yesterday from [facility]. Ultrasound showed a blood clot. Pt called to ask if he should be working?     Also stated the pharmacy did not have needed prescription yesterday so he will be starting medication tonight.     I can call pt back when needed

## 2024-08-07 ENCOUNTER — TELEPHONE (OUTPATIENT)
Dept: UROLOGY | Facility: CLINIC | Age: 64
End: 2024-08-07
Payer: COMMERCIAL

## 2024-08-07 NOTE — TELEPHONE ENCOUNTER
Pt started on blood thinner for a clot and now urine is dark colored, brownish in color, pt would like a return call

## 2024-08-08 ENCOUNTER — APPOINTMENT (OUTPATIENT)
Dept: PRIMARY CARE | Facility: CLINIC | Age: 64
End: 2024-08-08
Payer: COMMERCIAL

## 2024-08-14 ENCOUNTER — TELEPHONE (OUTPATIENT)
Dept: UROLOGY | Facility: CLINIC | Age: 64
End: 2024-08-14
Payer: COMMERCIAL

## 2024-08-14 NOTE — TELEPHONE ENCOUNTER
Pt is calling for updated CT orders to be faxed to [phone] [facility]

## 2024-08-17 ENCOUNTER — LAB (OUTPATIENT)
Dept: LAB | Facility: LAB | Age: 64
End: 2024-08-17
Payer: COMMERCIAL

## 2024-08-17 DIAGNOSIS — N40.1 BPH WITH OBSTRUCTION/LOWER URINARY TRACT SYMPTOMS: ICD-10-CM

## 2024-08-17 DIAGNOSIS — R31.0 GROSS HEMATURIA: ICD-10-CM

## 2024-08-17 DIAGNOSIS — N13.8 BPH WITH OBSTRUCTION/LOWER URINARY TRACT SYMPTOMS: ICD-10-CM

## 2024-08-17 LAB
ANION GAP SERPL CALC-SCNC: 12 MMOL/L (ref 10–20)
BUN SERPL-MCNC: 18 MG/DL (ref 6–23)
CALCIUM SERPL-MCNC: 8.5 MG/DL (ref 8.6–10.6)
CHLORIDE SERPL-SCNC: 105 MMOL/L (ref 98–107)
CO2 SERPL-SCNC: 28 MMOL/L (ref 21–32)
CREAT SERPL-MCNC: 0.98 MG/DL (ref 0.5–1.3)
EGFRCR SERPLBLD CKD-EPI 2021: 86 ML/MIN/1.73M*2
GLUCOSE SERPL-MCNC: 125 MG/DL (ref 74–99)
POTASSIUM SERPL-SCNC: 4 MMOL/L (ref 3.5–5.3)
SODIUM SERPL-SCNC: 141 MMOL/L (ref 136–145)

## 2024-08-17 PROCEDURE — 80048 BASIC METABOLIC PNL TOTAL CA: CPT

## 2024-08-17 PROCEDURE — 36415 COLL VENOUS BLD VENIPUNCTURE: CPT

## 2024-08-30 ENCOUNTER — APPOINTMENT (OUTPATIENT)
Dept: UROLOGY | Facility: CLINIC | Age: 64
End: 2024-08-30
Payer: COMMERCIAL

## 2024-08-30 VITALS
HEIGHT: 68 IN | TEMPERATURE: 97.5 F | WEIGHT: 287.4 LBS | BODY MASS INDEX: 43.56 KG/M2 | SYSTOLIC BLOOD PRESSURE: 134 MMHG | HEART RATE: 58 BPM | DIASTOLIC BLOOD PRESSURE: 78 MMHG

## 2024-08-30 DIAGNOSIS — R31.0 GROSS HEMATURIA: Primary | ICD-10-CM

## 2024-08-30 DIAGNOSIS — R53.83 OTHER FATIGUE: ICD-10-CM

## 2024-08-30 DIAGNOSIS — N13.8 BPH WITH OBSTRUCTION/LOWER URINARY TRACT SYMPTOMS: ICD-10-CM

## 2024-08-30 DIAGNOSIS — N40.1 BPH WITH OBSTRUCTION/LOWER URINARY TRACT SYMPTOMS: ICD-10-CM

## 2024-08-30 LAB
POC APPEARANCE, URINE: CLEAR
POC BILIRUBIN, URINE: NEGATIVE
POC BLOOD, URINE: ABNORMAL
POC COLOR, URINE: YELLOW
POC GLUCOSE, URINE: NEGATIVE MG/DL
POC KETONES, URINE: NEGATIVE MG/DL
POC LEUKOCYTES, URINE: NEGATIVE
POC NITRITE,URINE: NEGATIVE
POC PH, URINE: 5.5 PH
POC PROTEIN, URINE: NEGATIVE MG/DL
POC SPECIFIC GRAVITY, URINE: 1.01
POC UROBILINOGEN, URINE: 0.2 EU/DL

## 2024-08-30 PROCEDURE — 99214 OFFICE O/P EST MOD 30 MIN: CPT | Performed by: NURSE PRACTITIONER

## 2024-08-30 PROCEDURE — 81003 URINALYSIS AUTO W/O SCOPE: CPT | Performed by: NURSE PRACTITIONER

## 2024-08-30 PROCEDURE — 3075F SYST BP GE 130 - 139MM HG: CPT | Performed by: NURSE PRACTITIONER

## 2024-08-30 PROCEDURE — G2211 COMPLEX E/M VISIT ADD ON: HCPCS | Performed by: NURSE PRACTITIONER

## 2024-08-30 PROCEDURE — 3008F BODY MASS INDEX DOCD: CPT | Performed by: NURSE PRACTITIONER

## 2024-08-30 PROCEDURE — 3078F DIAST BP <80 MM HG: CPT | Performed by: NURSE PRACTITIONER

## 2024-08-30 NOTE — PROGRESS NOTES
"Subjective   Patient ID: Omar Godl \"Juan J\" is a 64 y.o. male who presents for Follow-up (Annual follow-up for BPH with LUTS) and Blood in Urine.  Presents for follow up of gross hematuria. Previously seen in Aug 2023 for BPH with LUTS.He states he has occasional urgency and straining with frequency as well as a weak stream. Denies urgency. NTF remains stable 1-2. Occasional worsening of LUTS. Denies history of dysuria and gross hematuria. Drinks close to 100 oz of liquid daily, minimal caffeine.      History of bladder stones, treated in 2010 as well as TURP in 2010. Previously on tamsulosin, switched to tadalafil 5mg in Aug 2024.      Minor ED symptoms at present. Occasional spontaneous erections, poor quality.      PSA 1.7 in April 2023, decreased from 3.1 in fall 2022.      Developed painless gross hematuria on 6/1. States he received a new bottle of tadalafil on 5/28 with slight increase in nocturia up to 2-3, previously 1. States gross hematuria occurred on 6/1 after yard work, did not feel this was particularly strenuous. States it resolved over the next day. Historically notes very small amount of pink urine in Jan after he was riding a bike. Recurrence of gross hematuria 2-3 times since June, most recently on 8/28. Always resolves spontaneously.    [facility] [place]. Newly diagnosed with LLE DVT this past summer, hx of DVTs off of blood thinners now for the last 3-4 years. Restarted on eliquis, likely lifelong.     Concerned about low testosterone, would like it tested. Patient does endorse occasional PND            Review of Systems   All other systems reviewed and are negative.      Objective   Physical Exam  Vitals reviewed.     Alert and oriented x3  Moist mucous membranes  Breathes easily on room air  Abdomen soft, nondistended. Obese  No edema  No scleral icterus  No focal neurological deficits  Appears stated age, no acute distress  BLE +2 " pitting edema     Office Visit on 08/30/2024   Component Date Value Ref Range Status    POC Color, Urine 08/30/2024 Yellow  Straw, Yellow, Light-Yellow Final    POC Appearance, Urine 08/30/2024 Clear  Clear Final    POC Glucose, Urine 08/30/2024 NEGATIVE  NEGATIVE mg/dl Final    POC Bilirubin, Urine 08/30/2024 NEGATIVE  NEGATIVE Final    POC Ketones, Urine 08/30/2024 NEGATIVE  NEGATIVE mg/dl Final    POC Specific Gravity, Urine 08/30/2024 1.010  1.005 - 1.035 Final    POC Blood, Urine 08/30/2024 TRACE-Intact (A)  NEGATIVE Final    POC PH, Urine 08/30/2024 5.5  No Reference Range Established PH Final    POC Protein, Urine 08/30/2024 NEGATIVE  NEGATIVE, 30 (1+) mg/dl Final    POC Urobilinogen, Urine 08/30/2024 0.2  0.2, 1.0 EU/DL Final    Poc Nitrite, Urine 08/30/2024 NEGATIVE  NEGATIVE Final    POC Leukocytes, Urine 08/30/2024 NEGATIVE  NEGATIVE Final         Assessment/Plan   Diagnoses and all orders for this visit:  Gross hematuria  BPH with obstruction/lower urinary tract symptoms  -     POCT UA Automated manually resulted  -     Post-Void Residual  Other fatigue  -     Testosterone,Free and Total; Future    Will complete appt for CTU on 9/6 and reschedule cysto afterwards. May consider checking testosterone level also. Discussed that I would recommend sleep study for possible GIBSON prior to initiating testosterone replacement if necessary. Patient is in agreement with plan.       Aniyah Bell, SUZI-CNP 08/30/24 11:22 AM

## 2024-09-03 DIAGNOSIS — I82.4Y2 ACUTE DEEP VEIN THROMBOSIS (DVT) OF PROXIMAL VEIN OF LEFT LOWER EXTREMITY (MULTI): ICD-10-CM

## 2024-09-03 RX ORDER — APIXABAN 5 MG (74)
KIT ORAL
Qty: 74 TABLET | Refills: 0 | Status: SHIPPED | OUTPATIENT
Start: 2024-09-03

## 2024-09-12 ENCOUNTER — TELEPHONE (OUTPATIENT)
Dept: PRIMARY CARE | Facility: CLINIC | Age: 64
End: 2024-09-12
Payer: COMMERCIAL

## 2024-09-12 ENCOUNTER — TELEPHONE (OUTPATIENT)
Dept: UROLOGY | Facility: CLINIC | Age: 64
End: 2024-09-12
Payer: COMMERCIAL

## 2024-09-12 NOTE — TELEPHONE ENCOUNTER
Patient is having urinary problems - - patient is on a blood thinner, he is passing blood, should patient continue blood thinner? CT done last week for kidney bladder etc. and they still don't have the results yet. [facility] (where the test was done), sent info to urologist, and urologist still doesn't have the information. Patient is passing straight blood. Please advise - patient is concerned.

## 2024-09-12 NOTE — TELEPHONE ENCOUNTER
DW pt  Intermittent red urine   Last urine was clear    The CT results are not in the chart  He dropped off the CT disc to Urology  Rosa Bell called him an hour ago as well - she is waiting for CT results  Cystoscopy scheduled for early Oct    I would recommend staying on Eliquis due to his recent DVT  Will continue fu with urology

## 2024-09-16 ENCOUNTER — TELEPHONE (OUTPATIENT)
Dept: UROLOGY | Facility: CLINIC | Age: 64
End: 2024-09-16
Payer: COMMERCIAL

## 2024-09-16 ENCOUNTER — HOSPITAL ENCOUNTER (EMERGENCY)
Facility: HOSPITAL | Age: 64
Discharge: HOME | End: 2024-09-16
Attending: STUDENT IN AN ORGANIZED HEALTH CARE EDUCATION/TRAINING PROGRAM
Payer: COMMERCIAL

## 2024-09-16 ENCOUNTER — DOCUMENTATION (OUTPATIENT)
Dept: UROLOGY | Facility: HOSPITAL | Age: 64
End: 2024-09-16
Payer: COMMERCIAL

## 2024-09-16 VITALS
DIASTOLIC BLOOD PRESSURE: 89 MMHG | TEMPERATURE: 96.8 F | WEIGHT: 290 LBS | OXYGEN SATURATION: 93 % | HEIGHT: 70 IN | RESPIRATION RATE: 18 BRPM | HEART RATE: 61 BPM | SYSTOLIC BLOOD PRESSURE: 189 MMHG | BODY MASS INDEX: 41.52 KG/M2

## 2024-09-16 DIAGNOSIS — R31.0 GROSS HEMATURIA: Primary | ICD-10-CM

## 2024-09-16 LAB
APPEARANCE UR: ABNORMAL
BILIRUB UR STRIP.AUTO-MCNC: NEGATIVE MG/DL
COLOR UR: ABNORMAL
GLUCOSE UR STRIP.AUTO-MCNC: NORMAL MG/DL
HOLD SPECIMEN: NORMAL
KETONES UR STRIP.AUTO-MCNC: NEGATIVE MG/DL
LEUKOCYTE ESTERASE UR QL STRIP.AUTO: ABNORMAL
MUCOUS THREADS #/AREA URNS AUTO: ABNORMAL /LPF
NITRITE UR QL STRIP.AUTO: NEGATIVE
PH UR STRIP.AUTO: 6.5 [PH]
PROT UR STRIP.AUTO-MCNC: ABNORMAL MG/DL
RBC # UR STRIP.AUTO: ABNORMAL /UL
RBC #/AREA URNS AUTO: >20 /HPF
SP GR UR STRIP.AUTO: 1.02
UROBILINOGEN UR STRIP.AUTO-MCNC: NORMAL MG/DL
WBC #/AREA URNS AUTO: >50 /HPF

## 2024-09-16 PROCEDURE — 81001 URINALYSIS AUTO W/SCOPE: CPT | Performed by: STUDENT IN AN ORGANIZED HEALTH CARE EDUCATION/TRAINING PROGRAM

## 2024-09-16 PROCEDURE — 99283 EMERGENCY DEPT VISIT LOW MDM: CPT

## 2024-09-16 PROCEDURE — 87086 URINE CULTURE/COLONY COUNT: CPT | Mod: STJLAB | Performed by: STUDENT IN AN ORGANIZED HEALTH CARE EDUCATION/TRAINING PROGRAM

## 2024-09-16 PROCEDURE — 99284 EMERGENCY DEPT VISIT MOD MDM: CPT | Performed by: STUDENT IN AN ORGANIZED HEALTH CARE EDUCATION/TRAINING PROGRAM

## 2024-09-16 RX ORDER — FINASTERIDE 5 MG/1
5 TABLET, FILM COATED ORAL DAILY
Qty: 30 TABLET | Refills: 0 | Status: SHIPPED | OUTPATIENT
Start: 2024-09-16 | End: 2024-10-16

## 2024-09-16 ASSESSMENT — COLUMBIA-SUICIDE SEVERITY RATING SCALE - C-SSRS
2. HAVE YOU ACTUALLY HAD ANY THOUGHTS OF KILLING YOURSELF?: NO
6. HAVE YOU EVER DONE ANYTHING, STARTED TO DO ANYTHING, OR PREPARED TO DO ANYTHING TO END YOUR LIFE?: NO
1. IN THE PAST MONTH, HAVE YOU WISHED YOU WERE DEAD OR WISHED YOU COULD GO TO SLEEP AND NOT WAKE UP?: NO

## 2024-09-16 ASSESSMENT — PAIN SCALES - GENERAL
PAINLEVEL_OUTOF10: 0 - NO PAIN
PAINLEVEL_OUTOF10: 0 - NO PAIN

## 2024-09-16 ASSESSMENT — PAIN - FUNCTIONAL ASSESSMENT: PAIN_FUNCTIONAL_ASSESSMENT: 0-10

## 2024-09-16 ASSESSMENT — LIFESTYLE VARIABLES
TOTAL SCORE: 0
EVER FELT BAD OR GUILTY ABOUT YOUR DRINKING: NO
HAVE YOU EVER FELT YOU SHOULD CUT DOWN ON YOUR DRINKING: NO
HAVE PEOPLE ANNOYED YOU BY CRITICIZING YOUR DRINKING: NO
EVER HAD A DRINK FIRST THING IN THE MORNING TO STEADY YOUR NERVES TO GET RID OF A HANGOVER: NO

## 2024-09-16 NOTE — TELEPHONE ENCOUNTER
ER called AGUSTINA and he is in surgery ?  Not answering the lady said.  This patient is Er with Hematuria, had appt with AGUSTINA for a cysto on 7/3 with Jassi that the patient cancelled. Rescheduled for 10/8 and now the patient wants in ASAP as he is in the ER now.    Can you see or advise for today since in clinic

## 2024-09-16 NOTE — DISCHARGE INSTRUCTIONS
Take the finasteride once a day as prescribed and follow-up with Dr. Keene next month as scheduled.  The finasteride can cause erectile dysfunction and low libido as a side effect.  Return to the emergency department for flank or abdominal pain, lightheadedness, worsening hematuria, or you have any questions or concerns.

## 2024-09-16 NOTE — ED PROVIDER NOTES
Emergency Department Provider Note        History of Present Illness     History provided by: Patient and Significant Other  Limitations to History: None  External Records Reviewed with Brief Summary: Outpatient progress note from 8/30/2024 which showed outpatient visit with urology    HPI:  Omar Gold is a 64 y.o. male with BPH, DVT on Eliquis, diabetes, hypertension presenting with hematuria.  Symptoms have been ongoing intermittently for the last 3 months.  However, it is more frequent.  Typically resolves after 1 or 2 days, but this episode has persisted and he comes to the emergency department.  He has contacted his urologist office and has an appointment with Dr. Keene on 10/8.  He did also reach out to his primary care provider regarding his Eliquis, but given that the risk of pulmonary embolism from his DVT is high, it was not recommended that he stop the Eliquis.  He is not having any lightheadedness, chest pain, shortness of breath, abdominal pain, flank pain, dysuria.    Physical Exam   Triage vitals:  T 36 °C (96.8 °F)  HR 69  BP (!) 204/91  RR 18  O2 96 % None (Room air)    Physical Exam  Vitals and nursing note reviewed.   Constitutional:       General: He is not in acute distress.     Appearance: He is well-developed.   HENT:      Head: Normocephalic and atraumatic.      Right Ear: External ear normal.      Left Ear: External ear normal.      Nose: Nose normal.   Eyes:      General: No scleral icterus.     Conjunctiva/sclera: Conjunctivae normal.      Pupils: Pupils are equal, round, and reactive to light.   Cardiovascular:      Rate and Rhythm: Normal rate and regular rhythm.      Heart sounds: No murmur heard.  Pulmonary:      Effort: Pulmonary effort is normal. No respiratory distress.      Breath sounds: Normal breath sounds.   Abdominal:      Palpations: Abdomen is soft.      Tenderness: There is no abdominal tenderness.   Musculoskeletal:         General: No swelling.      Cervical  back: Neck supple. No rigidity.   Skin:     General: Skin is warm and dry.   Neurological:      General: No focal deficit present.      Mental Status: He is alert.   Psychiatric:         Mood and Affect: Mood normal.          Medical Decision Making & ED Course   Medical Decision Makin y.o. male presenting gross hematuria.  On arrival, he is afebrile hemodynamically stable.  No tachycardia, hypotension, lightheadedness, chest pain, shortness of breath. Clinically, low suspicion for significant anemia.  We discussed obtaining lab work with the patient, and he agrees that it is not necessary at this time.  Urinalysis shows significant blood, with leukocyte esterase and white cells.  We suspect that the white cells and leukocyte esterase are secondary to the white cells found in gross blood rather than signs of infection.  Urine will be sent for culture.  He already has an appointment with Dr. Keene next month.  He was already instructed to continue taking his Eliquis.  We reached out to Dr. Quiroz, who is on-call for Dr. Keene's practice.  He recommends starting finasteride at this time to help with the BPH and hematuria, although it can take several days before it starts to work.  It can cause low libido and erectile dysfunction.  These recommendations and warnings were relayed to the patient.  He is given a prescription for finasteride and instructed to keep his appointment.  Home care and return instructions discussed. Patient expressed understanding and agreement. Patient discharged in stable condition.    Sergio Rendon DO, PGY-4  Emergency Medicine Resident  ----      Differential diagnoses considered include but are not limited to: Urinary tract infection     Social Determinants of Health which Significantly Impact Care: None identified     EKG Independent Interpretation: EKG not obtained    Independent Result Review and Interpretation: Relevant laboratory and radiographic results were reviewed and  independently interpreted by myself.  As necessary, they are commented on in the ED Course.    Chronic conditions affecting the patient's care: As documented above in MDM    The patient was discussed with the following consultants/services: Dr. Quiroz    Care Considerations: As documented above in [facility]    ED Course:  Diagnoses as of 09/16/24 2121   Gross hematuria     Disposition   As a result of the work-up, the patient was discharged home.  he was informed of his diagnosis and instructed to come back with any concerns or worsening of condition.  he and was agreeable to the plan as discussed above.  he was given the opportunity to ask questions.  All of the patient's questions were answered.    Procedures   Procedures    Patient seen and discussed with ED attending physician.    Sergio Rendon DO  Emergency Medicine     Sergio Rendon DO  Resident  09/16/24 2121

## 2024-09-17 LAB — BACTERIA UR CULT: NO GROWTH

## 2024-09-17 NOTE — PROGRESS NOTES
UROLOGY ON-CALL RESIDENT TELEPHONE NOTE    Omar Gold  [id number]  1960 (64 y.o.)    Page received from  to call patient.      Briefly, this is a 64 y.o. male w/PMHx most notable for BPH w/ LUTS, gross hematuria, who called the urology phone line due to urinating bloody urine with clot. Was seen at [place] ED earlier in day and discharged home. UA small LE, neg NI. Started on finasteride. Of note, patient takes Eliquis.    He reports voiding red urine today and this evening passed 1 clot. Thinks he is still emptying his bladder adequately. Also reports some left groin pain but no suprapubic pain or flank pain. Denies fevers/chills, dysuria, light-headedness, or SOB.    [facility] CT urogram report 9/4 with bilateral renal cysts, no stones, and prostate enlargement. Scheduled for cysto with Dr. Keene on 10/8/24.           Disposition: continue to increase fluid intake and monitor urine output. Advised to present to ED if increasing clots, urinary retention, or if he develops fevers/chills or increasing pain. Discussed that he may need delong catheter placement and bladder irrigation in ED if hematuria worsens and he is unable to void. Continue finasteride. Patient and wife expressed understanding.       Redd Faith MD  Urologic Surgery PGY-3  Covering Main [place] Only  Adult Urology: [id number]    Pediatric Urology: [id number]

## 2024-09-18 ENCOUNTER — TELEPHONE (OUTPATIENT)
Dept: UROLOGY | Facility: CLINIC | Age: 64
End: 2024-09-18
Payer: COMMERCIAL

## 2024-09-18 NOTE — TELEPHONE ENCOUNTER
Received call from pt to discuss concerns with continued hematuria and blood clots. States clots are most prevalent after strenuous activity. Advised him to avoid such activity. He stated he his job requires physical activity and requested a note stating he can't work. Advised that Dr. Keene can't provide a note at this time as he did not treat him during his 9/16 visit in the ED. Denies symptoms are worsening since his ED visit and states he is still able to urinate. No further urinary complaints offered, denies lightheadedness or pain. Pt was prescribed finasteride after his ED visit on 9/16 for BPH and hematuria. Pt repeatedly stated he is declining to take finasteride due to potential side effects. Discussed with pt that the medication was ordered to help alleviate his current complaints, but he declines to take it. Pt has a cystoscopy appointment with Dr. Keene on 10/8/24 and requested the appointment be moved to an earlier date. Pt was placed on wait list for earlier appointment. States he is drinking over 64 ounces of water daily and avoiding alcohol and caffeine. Advised pt continue with water intake, and return to the ED if bleeding worsens or he is unable to urinate. Verbalized understanding.

## 2024-09-19 ENCOUNTER — TELEPHONE (OUTPATIENT)
Dept: UROLOGY | Facility: CLINIC | Age: 64
End: 2024-09-19
Payer: COMMERCIAL

## 2024-09-19 ENCOUNTER — PATIENT MESSAGE (OUTPATIENT)
Dept: UROLOGY | Facility: CLINIC | Age: 64
End: 2024-09-19
Payer: COMMERCIAL

## 2024-09-20 ENCOUNTER — HOSPITAL ENCOUNTER (EMERGENCY)
Facility: HOSPITAL | Age: 64
Discharge: HOME | End: 2024-09-20
Attending: STUDENT IN AN ORGANIZED HEALTH CARE EDUCATION/TRAINING PROGRAM
Payer: COMMERCIAL

## 2024-09-20 VITALS
TEMPERATURE: 96.8 F | OXYGEN SATURATION: 95 % | DIASTOLIC BLOOD PRESSURE: 73 MMHG | BODY MASS INDEX: 39.8 KG/M2 | HEART RATE: 74 BPM | HEIGHT: 70 IN | WEIGHT: 278 LBS | SYSTOLIC BLOOD PRESSURE: 146 MMHG | RESPIRATION RATE: 16 BRPM

## 2024-09-20 DIAGNOSIS — R31.0 GROSS HEMATURIA: Primary | ICD-10-CM

## 2024-09-20 LAB
ALBUMIN SERPL BCP-MCNC: 4.3 G/DL (ref 3.4–5)
ALP SERPL-CCNC: 90 U/L (ref 33–136)
ALT SERPL W P-5'-P-CCNC: 19 U/L (ref 10–52)
ANION GAP SERPL CALC-SCNC: 14 MMOL/L (ref 10–20)
APPEARANCE UR: CLEAR
AST SERPL W P-5'-P-CCNC: 16 U/L (ref 9–39)
BASOPHILS # BLD AUTO: 0.05 X10*3/UL (ref 0–0.1)
BASOPHILS NFR BLD AUTO: 0.5 %
BILIRUB SERPL-MCNC: 0.6 MG/DL (ref 0–1.2)
BILIRUB UR STRIP.AUTO-MCNC: NEGATIVE MG/DL
BUN SERPL-MCNC: 20 MG/DL (ref 6–23)
CALCIUM SERPL-MCNC: 9.1 MG/DL (ref 8.6–10.3)
CHLORIDE SERPL-SCNC: 97 MMOL/L (ref 98–107)
CO2 SERPL-SCNC: 26 MMOL/L (ref 21–32)
COLOR UR: NORMAL
CREAT SERPL-MCNC: 1.08 MG/DL (ref 0.5–1.3)
EGFRCR SERPLBLD CKD-EPI 2021: 77 ML/MIN/1.73M*2
EOSINOPHIL # BLD AUTO: 0.08 X10*3/UL (ref 0–0.7)
EOSINOPHIL NFR BLD AUTO: 0.8 %
ERYTHROCYTE [DISTWIDTH] IN BLOOD BY AUTOMATED COUNT: 12.3 % (ref 11.5–14.5)
GLUCOSE SERPL-MCNC: 277 MG/DL (ref 74–99)
GLUCOSE UR STRIP.AUTO-MCNC: NORMAL MG/DL
HCT VFR BLD AUTO: 44.9 % (ref 41–52)
HGB BLD-MCNC: 15.5 G/DL (ref 13.5–17.5)
HYALINE CASTS #/AREA URNS AUTO: ABNORMAL /LPF
IMM GRANULOCYTES # BLD AUTO: 0.04 X10*3/UL (ref 0–0.7)
IMM GRANULOCYTES NFR BLD AUTO: 0.4 % (ref 0–0.9)
KETONES UR STRIP.AUTO-MCNC: NEGATIVE MG/DL
LEUKOCYTE ESTERASE UR QL STRIP.AUTO: NEGATIVE
LYMPHOCYTES # BLD AUTO: 2.16 X10*3/UL (ref 1.2–4.8)
LYMPHOCYTES NFR BLD AUTO: 22.3 %
MCH RBC QN AUTO: 33.4 PG (ref 26–34)
MCHC RBC AUTO-ENTMCNC: 34.5 G/DL (ref 32–36)
MCV RBC AUTO: 97 FL (ref 80–100)
MONOCYTES # BLD AUTO: 0.59 X10*3/UL (ref 0.1–1)
MONOCYTES NFR BLD AUTO: 6.1 %
MUCOUS THREADS #/AREA URNS AUTO: ABNORMAL /LPF
NEUTROPHILS # BLD AUTO: 6.76 X10*3/UL (ref 1.2–7.7)
NEUTROPHILS NFR BLD AUTO: 69.9 %
NITRITE UR QL STRIP.AUTO: NEGATIVE
NRBC BLD-RTO: 0 /100 WBCS (ref 0–0)
PH UR STRIP.AUTO: 5 [PH]
PLATELET # BLD AUTO: 270 X10*3/UL (ref 150–450)
POTASSIUM SERPL-SCNC: 3.2 MMOL/L (ref 3.5–5.3)
PROT SERPL-MCNC: 7.3 G/DL (ref 6.4–8.2)
PROT UR STRIP.AUTO-MCNC: NORMAL MG/DL
RBC # BLD AUTO: 4.64 X10*6/UL (ref 4.5–5.9)
RBC # UR STRIP.AUTO: NEGATIVE /UL
RBC #/AREA URNS AUTO: ABNORMAL /HPF
SODIUM SERPL-SCNC: 134 MMOL/L (ref 136–145)
SP GR UR STRIP.AUTO: 1.01
UROBILINOGEN UR STRIP.AUTO-MCNC: NORMAL MG/DL
WBC # BLD AUTO: 9.7 X10*3/UL (ref 4.4–11.3)
WBC #/AREA URNS AUTO: ABNORMAL /HPF
WBC CLUMPS #/AREA URNS AUTO: ABNORMAL /HPF

## 2024-09-20 PROCEDURE — 81001 URINALYSIS AUTO W/SCOPE: CPT | Performed by: STUDENT IN AN ORGANIZED HEALTH CARE EDUCATION/TRAINING PROGRAM

## 2024-09-20 PROCEDURE — 36415 COLL VENOUS BLD VENIPUNCTURE: CPT

## 2024-09-20 PROCEDURE — 80053 COMPREHEN METABOLIC PANEL: CPT

## 2024-09-20 PROCEDURE — 85025 COMPLETE CBC W/AUTO DIFF WBC: CPT

## 2024-09-20 PROCEDURE — 99285 EMERGENCY DEPT VISIT HI MDM: CPT | Performed by: STUDENT IN AN ORGANIZED HEALTH CARE EDUCATION/TRAINING PROGRAM

## 2024-09-20 PROCEDURE — 99283 EMERGENCY DEPT VISIT LOW MDM: CPT

## 2024-09-20 PROCEDURE — 81001 URINALYSIS AUTO W/SCOPE: CPT | Performed by: EMERGENCY MEDICINE

## 2024-09-20 ASSESSMENT — LIFESTYLE VARIABLES
HAVE YOU EVER FELT YOU SHOULD CUT DOWN ON YOUR DRINKING: NO
TOTAL SCORE: 0
EVER HAD A DRINK FIRST THING IN THE MORNING TO STEADY YOUR NERVES TO GET RID OF A HANGOVER: NO
HAVE PEOPLE ANNOYED YOU BY CRITICIZING YOUR DRINKING: NO
EVER FELT BAD OR GUILTY ABOUT YOUR DRINKING: NO

## 2024-09-20 ASSESSMENT — COLUMBIA-SUICIDE SEVERITY RATING SCALE - C-SSRS
6. HAVE YOU EVER DONE ANYTHING, STARTED TO DO ANYTHING, OR PREPARED TO DO ANYTHING TO END YOUR LIFE?: NO
2. HAVE YOU ACTUALLY HAD ANY THOUGHTS OF KILLING YOURSELF?: NO
1. IN THE PAST MONTH, HAVE YOU WISHED YOU WERE DEAD OR WISHED YOU COULD GO TO SLEEP AND NOT WAKE UP?: NO

## 2024-09-20 ASSESSMENT — PAIN SCALES - GENERAL
PAINLEVEL_OUTOF10: 0 - NO PAIN

## 2024-09-20 ASSESSMENT — PAIN - FUNCTIONAL ASSESSMENT: PAIN_FUNCTIONAL_ASSESSMENT: 0-10

## 2024-09-20 NOTE — ED PROVIDER NOTES
EMERGENCY DEPARTMENT ENCOUNTER      Pt Name: Omar Gold  MRN: [id number]  Birthdate 1960  Date of evaluation: 9/20/2024  Provider: Enzo Mustafa DO    CHIEF COMPLAINT       Chief Complaint   Patient presents with    Blood in Urine     Pt was seen here on Monday for same issue and was discharged, pt states urine is clear today, but yesterday had blood in urine    Difficulty Urinating         HISTORY OF PRESENT ILLNESS    HPI    64-year-old male with past medical history significant for BPH, DVT currently on Eliquis, diabetes, hypertension presenting to the emergency department for evaluation of hematuria.  Patient states he has had intermittent hematuria for the past 3 months which is becoming more frequent particular over the past couple weeks.  Reports passing clots in his urine.  Still able to urinate at this time and was able to provide a urine sample tenderness staff on arrival to the emergency department.  Patient denies any abdominal or flank pain.  Patient is concerned about weight loss as he states he has had 10 pound weight loss in the past couple days.  Denies fevers, chills, night sweats, chest pain, difficulty breathing, lightheadedness.    Nursing Notes were reviewed.    PAST MEDICAL HISTORY     Past Medical History:   Diagnosis Date    Other pulmonary embolism without acute cor pulmonale (Multi) 07/24/2017    Pulmonary embolism    Personal history of other diseases of urinary system     History of bladder stone    Personal history of other specified conditions 07/24/2017    History of abdominal pain    Personal history of other venous thrombosis and embolism 11/21/2019    History of deep vein thrombosis (DVT) of lower extremity    Personal history of other venous thrombosis and embolism 07/24/2017    History of deep venous thrombosis    Strain of muscle, fascia and tendon of lower back, initial encounter 05/22/2020    Lumbar strain         SURGICAL HISTORY       Past Surgical History:    Procedure Laterality Date    CHOLECYSTECTOMY  07/24/2017    Cholecystectomy    COLONOSCOPY  06/23/2023    OTHER SURGICAL HISTORY  06/26/2013    ERCP With Biopsy    OTHER SURGICAL HISTORY  07/24/2017    Incisional Hernia Repair    OTHER SURGICAL HISTORY  12/07/2017    Cholelithotomy    REFRACTIVE SURGERY  08/09/2018    Corneal LASIK    TRANSURETHRAL RESECTION OF PROSTATE  06/26/2013    Transurethral Resection Of Prostate (TURP)         CURRENT MEDICATIONS       Discharge Medication List as of 9/20/2024  8:29 PM        CONTINUE these medications which have NOT CHANGED    Details   apixaban (Eliquis DVT-PE Treat 30D Start) 5 mg (74 tabs) tablet TAKE 2 TABLETS BY MOUTH 2 TIMES DAILY FOR 7 DAYS. THEN DECREASE TO 1 TABLET 2 TIMES DAILY THEREAFTER., Normal      ascorbic acid (Vitamin C) 500 mg tablet Take 2 tablets (1,000 mg) by mouth once daily., Starting Thu 8/9/2018, Historical Med      atorvastatin (Lipitor) 10 mg tablet Take 1 tablet (10 mg) by mouth once daily., Starting Mon 8/5/2024, Normal      cholecalciferol (Vitamin D-3) 125 MCG (5000 UT) capsule Take by mouth., Starting Thu 8/26/2021, Historical Med      finasteride (Proscar) 5 mg tablet Take 1 tablet (5 mg) by mouth once daily. Do not crush, chew, or split., Starting Mon 9/16/2024, Until Wed 10/16/2024, Normal      metFORMIN (Glucophage) 500 mg tablet TAKE ONE TABLET BY MOUTH TWO TIMES A DAY WITH MEALS., Starting Fri 7/26/2024, Normal      !! metoprolol succinate XL (Toprol XL) 100 mg 24 hr tablet Take 1 tablet (100 mg) by mouth once daily. Do not crush or chew., Starting Mon 8/5/2024, Until Tue 8/5/2025, Normal      !! metoprolol succinate XL (Toprol-XL) 50 mg 24 hr tablet Take 1 tablet (50 mg) by mouth once daily., Starting Mon 8/5/2024, Normal      olmesartan-hydrochlorothiazide (BENIcar HCT) 40-25 mg tablet Take 1 tablet by mouth once daily., Starting Mon 8/5/2024, Normal      tadalafil (Cialis) 5 mg tablet Take 1 tablet (5 mg) by mouth once daily.,  Starting Tue 3/26/2024, Until Wed 3/26/2025, Normal       !! - Potential duplicate medications found. Please discuss with provider.          ALLERGIES     Patient has no known allergies.    FAMILY HISTORY       Family History   Problem Relation Name Age of Onset    Breast cancer Mother      Diabetes Mother      Hypertension Father      Other (chronic leukemia) Father            SOCIAL HISTORY       Social History     Socioeconomic History    Marital status:    Tobacco Use    Smoking status: Never    Smokeless tobacco: Never   Vaping Use    Vaping status: Never Used   Substance and Sexual Activity    Alcohol use: Never    Drug use: Never     Social Determinants of Health     Food Insecurity: Unknown (1/16/2024)    Received from [facility] Vital Sign     Worried About Running Out of Food in the Last Year: Never true       SCREENINGS                        PHYSICAL EXAM    (up to 7 for level 4, 8 or more for level 5)     ED Triage Vitals [09/20/24 1524]   Temperature Heart Rate Respirations BP   36 °C (96.8 °F) 68 18 178/81      Pulse Ox Temp Source Heart Rate Source Patient Position   97 % Temporal Monitor Sitting      BP Location FiO2 (%)     -- --       Physical Exam  Vitals and nursing note reviewed.   Constitutional:       Appearance: Normal appearance.   HENT:      Head: Normocephalic and atraumatic.      Right Ear: External ear normal.      Left Ear: External ear normal.      Nose: Nose normal.      Mouth/Throat:      Pharynx: Oropharynx is clear.   Eyes:      Conjunctiva/sclera: Conjunctivae normal.   Cardiovascular:      Rate and Rhythm: Normal rate and regular rhythm.      Pulses: Normal pulses.      Heart sounds: Normal heart sounds.   Pulmonary:      Effort: Pulmonary effort is normal.      Breath sounds: Normal breath sounds.   Abdominal:      General: Abdomen is flat. There is no distension.      Palpations: Abdomen is soft.      Tenderness: There is no abdominal  tenderness. There is no right CVA tenderness, left CVA tenderness, guarding or rebound.   Musculoskeletal:         General: Normal range of motion.      Cervical back: Normal range of motion and neck supple. No rigidity or tenderness.      Right lower leg: No edema.      Left lower leg: No edema.   Skin:     General: Skin is warm and dry.   Neurological:      General: No focal deficit present.      Mental Status: He is alert and oriented to person, place, and time.   Psychiatric:         Mood and Affect: Mood normal.         Behavior: Behavior normal.          DIAGNOSTIC RESULTS     LABS:  Labs Reviewed   MICROSCOPIC ONLY, URINE - Abnormal       Result Value    WBC, Urine 1-5      WBC Clumps, Urine RARE      RBC, Urine 1-2      Mucus, Urine 1+      Hyaline Casts, Urine OCCASIONAL (*)    COMPREHENSIVE METABOLIC PANEL - Abnormal    Glucose 277 (*)     Sodium 134 (*)     Potassium 3.2 (*)     Chloride 97 (*)     Bicarbonate 26      Anion Gap 14      Urea Nitrogen 20      Creatinine 1.08      eGFR 77      Calcium 9.1      Albumin 4.3      Alkaline Phosphatase 90      Total Protein 7.3      AST 16      Bilirubin, Total 0.6      ALT 19     URINALYSIS WITH REFLEX MICROSCOPIC - Normal    Color, Urine Light-Yellow      Appearance, Urine Clear      Specific Gravity, Urine 1.013      pH, Urine 5.0      Protein, Urine 10 (TRACE)      Glucose, Urine Normal      Blood, Urine NEGATIVE      Ketones, Urine NEGATIVE      Bilirubin, Urine NEGATIVE      Urobilinogen, Urine Normal      Nitrite, Urine NEGATIVE      Leukocyte Esterase, Urine NEGATIVE     CBC WITH AUTO DIFFERENTIAL    WBC 9.7      nRBC 0.0      RBC 4.64      Hemoglobin 15.5      Hematocrit 44.9      MCV 97      MCH 33.4      MCHC 34.5      RDW 12.3      Platelets 270      Neutrophils % 69.9      Immature Granulocytes %, Automated 0.4      Lymphocytes % 22.3      Monocytes % 6.1      Eosinophils % 0.8      Basophils % 0.5      Neutrophils Absolute 6.76      Immature  Granulocytes Absolute, Automated 0.04      Lymphocytes Absolute 2.16      Monocytes Absolute 0.59      Eosinophils Absolute 0.08      Basophils Absolute 0.05         All other labs were within normal range or not returned as of this dictation.    Imaging  No orders to display        Procedures  Procedures     EMERGENCY DEPARTMENT COURSE/MDM:     ED Course as of 09/21/24 0030   Fri Sep 20, 2024   1916 Minimal blood in the urine at this time.  Did discuss the case with the patient's urologist Dr. Keene who stated no indication for admission at this time for cystoscopy.  This can be formed as an outpatient.  Will obtain CBC and metabolic panel to check for any sign of severe anemia or kidney dysfunction. [TL]      ED Course User Index  [TL] Leon Harrison, DO         Diagnoses as of 09/21/24 0030   Gross hematuria        Medical Decision Making    64-year-old male with past medical history significant for BPH, DVT currently on Eliquis, diabetes, hypertension presenting to the emergency department for evaluation of hematuria.  Hemodynamically stable, no acute distress, nontoxic-appearing, afebrile.  CBC, CMP, urinalysis ordered.  No evidence of acute pathology on laboratory evaluation.  Patient case discussed with patient's urologist Dr. Keene who advised no indication for admission at this time however advised he can see patient in the office on Tuesday for outpatient cystoscopy.  Patient deemed safe for discharge for outpatient follow-up with his urologist on Tuesday with strict return precautions.    Patient and or family in agreement and understanding of treatment plan.  All questions answered.      I reviewed the case with the attending ED physician. The attending ED physician agrees with the plan. Patient and/or patient´s representative was counseled regarding labs, imaging, likely diagnosis, and plan. All questions were answered.    ED Medications administered this visit:  Medications - No data to  display    New Prescriptions from this visit:    Discharge Medication List as of 9/20/2024  8:29 PM          Follow-up:  Madi Keene MD  [id number]  [place] [address]  [phone]    On 9/24/2024          Final Impression:   1. Gross hematuria          (Please note that portions of this note were completed with a voice recognition program.  Efforts were made to edit the dictations but occasionally words are mis-transcribed.)     Enzo Mustafa, DO  Resident  09/21/24 0030

## 2024-09-20 NOTE — DISCHARGE INSTRUCTIONS
Be sure to attend your upcoming appointment on Tuesday with your urologist.  Return to the emergency department me if experience any new or worsening symptoms such as inability to void, abdominal pain, fevers, chills, night sweats, nausea, vomiting, chest pain, difficulty breathing, lightheadedness or if you pass out.

## 2024-09-23 RX ORDER — LIDOCAINE HYDROCHLORIDE 20 MG/ML
1 JELLY TOPICAL ONCE
Status: COMPLETED | OUTPATIENT
Start: 2024-09-24 | End: 2024-09-24

## 2024-09-24 ENCOUNTER — PROCEDURE VISIT (OUTPATIENT)
Dept: UROLOGY | Facility: CLINIC | Age: 64
End: 2024-09-24
Payer: COMMERCIAL

## 2024-09-24 VITALS
BODY MASS INDEX: 40.2 KG/M2 | WEIGHT: 280.2 LBS | DIASTOLIC BLOOD PRESSURE: 78 MMHG | SYSTOLIC BLOOD PRESSURE: 148 MMHG | HEART RATE: 73 BPM | TEMPERATURE: 97.6 F

## 2024-09-24 DIAGNOSIS — N40.1 BENIGN PROSTATIC HYPERPLASIA WITH WEAK URINARY STREAM: Primary | ICD-10-CM

## 2024-09-24 DIAGNOSIS — R31.0 GROSS HEMATURIA: ICD-10-CM

## 2024-09-24 DIAGNOSIS — R39.12 BENIGN PROSTATIC HYPERPLASIA WITH WEAK URINARY STREAM: Primary | ICD-10-CM

## 2024-09-24 LAB
POC APPEARANCE, URINE: CLEAR
POC BILIRUBIN, URINE: NEGATIVE
POC BLOOD, URINE: NEGATIVE
POC COLOR, URINE: YELLOW
POC GLUCOSE, URINE: NEGATIVE MG/DL
POC KETONES, URINE: NEGATIVE MG/DL
POC LEUKOCYTES, URINE: NEGATIVE
POC NITRITE,URINE: NEGATIVE
POC PH, URINE: 5.5 PH
POC PROTEIN, URINE: NEGATIVE MG/DL
POC SPECIFIC GRAVITY, URINE: 1.02
POC UROBILINOGEN, URINE: 0.2 EU/DL

## 2024-09-24 PROCEDURE — 52000 CYSTOURETHROSCOPY: CPT | Performed by: UROLOGY

## 2024-09-24 PROCEDURE — 99214 OFFICE O/P EST MOD 30 MIN: CPT | Performed by: UROLOGY

## 2024-09-24 PROCEDURE — 81003 URINALYSIS AUTO W/O SCOPE: CPT | Performed by: UROLOGY

## 2024-09-24 NOTE — PROGRESS NOTES
"Subjective   Patient ID: Omar Gold \"Juan J\" is a 64 y.o. male new patient kindly referred by ER who presents for Blood in Urine.  The patient presented at ER on 9/16/24 with hematuria.     HPI  The patient is accompanied by his wife.  The patient states that his most recent hematuria was Sunday morning. The patient is s/p TURP in 2010.   The patient states his urinary flow is variable. He is taking tadalafil 5 mg daily.  The patient has finasteride already but his has not started taking it after reading about the potential side effects.     PMH includes DVT with the most recent in August and the patient is taking Eliquis. He was told he may be on it indefinitely.     The patient has a strenuous job. He has not been to work since he noted the hematuria.     PSA Results  Component  Ref Range & Units 1 yr ago  (4/10/23) 1 yr ago  (10/22/22) 2 yr ago  (10/9/21) 3 yr ago  (3/4/21) 4 yr ago  (8/21/20) 5 yr ago  (6/10/19) 6 yr ago  (12/18/17)   PSA  0.00 - 4.00 ng/mL 1.72 3.13 CM 1.40 CM 1.90 CM 2.15 CM 1.8 R, CM 1.04 CM     CT Urogram Results 16/24    Past Medical History  Past Medical History:   Diagnosis Date    Other pulmonary embolism without acute cor pulmonale (Multi) 07/24/2017    Pulmonary embolism    Personal history of other diseases of urinary system     History of bladder stone    Personal history of other specified conditions 07/24/2017    History of abdominal pain    Personal history of other venous thrombosis and embolism 11/21/2019    History of deep vein thrombosis (DVT) of lower extremity    Personal history of other venous thrombosis and embolism 07/24/2017    History of deep venous thrombosis    Strain of muscle, fascia and tendon of lower back, initial encounter 05/22/2020    Lumbar strain        Surgical History  Past Surgical History:   Procedure Laterality Date    CHOLECYSTECTOMY  07/24/2017    Cholecystectomy    COLONOSCOPY  06/23/2023    OTHER SURGICAL HISTORY  06/26/2013    ERCP With Biopsy "    OTHER SURGICAL HISTORY  07/24/2017    Incisional Hernia Repair    OTHER SURGICAL HISTORY  12/07/2017    Cholelithotomy    REFRACTIVE SURGERY  08/09/2018    Corneal LASIK    TRANSURETHRAL RESECTION OF PROSTATE  06/26/2013    Transurethral Resection Of Prostate (TURP)         Social History  He reports that he has never smoked. He has never used smokeless tobacco. He reports that he does not drink alcohol and does not use drugs.    Family History  Family History   Problem Relation Name Age of Onset    Breast cancer Mother      Diabetes Mother      Hypertension Father      Other (chronic leukemia) Father         Medications    Current Outpatient Medications:     apixaban (Eliquis DVT-PE Treat 30D Start) 5 mg (74 tabs) tablet, TAKE 2 TABLETS BY MOUTH 2 TIMES DAILY FOR 7 DAYS. THEN DECREASE TO 1 TABLET 2 TIMES DAILY THEREAFTER., Disp: 74 tablet, Rfl: 0    ascorbic acid (Vitamin C) 500 mg tablet, Take 2 tablets (1,000 mg) by mouth once daily., Disp: , Rfl:     atorvastatin (Lipitor) 10 mg tablet, Take 1 tablet (10 mg) by mouth once daily., Disp: 90 tablet, Rfl: 3    cholecalciferol (Vitamin D-3) 125 MCG (5000 UT) capsule, Take by mouth., Disp: , Rfl:     metFORMIN (Glucophage) 500 mg tablet, TAKE ONE TABLET BY MOUTH TWO TIMES A DAY WITH MEALS., Disp: 180 tablet, Rfl: 3    metoprolol succinate XL (Toprol XL) 100 mg 24 hr tablet, Take 1 tablet (100 mg) by mouth once daily. Do not crush or chew., Disp: 90 tablet, Rfl: 3    olmesartan-hydrochlorothiazide (BENIcar HCT) 40-25 mg tablet, Take 1 tablet by mouth once daily., Disp: 90 tablet, Rfl: 3    tadalafil (Cialis) 5 mg tablet, Take 1 tablet (5 mg) by mouth once daily., Disp: 90 tablet, Rfl: 3    finasteride (Proscar) 5 mg tablet, Take 1 tablet (5 mg) by mouth once daily. Do not crush, chew, or split. (Patient not taking: Reported on 9/24/2024), Disp: 30 tablet, Rfl: 0    metoprolol succinate XL (Toprol-XL) 50 mg 24 hr tablet, Take 1 tablet (50 mg) by mouth once daily.  (Patient not taking: Reported on 8/30/2024), Disp: 90 tablet, Rfl: 3  No current facility-administered medications for this visit.     Allergies  Patient has no known allergies.     Review of Systems  A 12 system review was completed and is negative with the exception of those signs and symptoms noted in the history of present illness.    Objective   Physical Exam  General: in NAD, appears stated age  Head: normocephalic, atraumatic  Neck: supple; trachea is midline  Respiratory: normal effort, no use of accessory muscles  Cardiovascular: no peripheral edema  Abdomen: soft, nondistended, nontender, no rebound or guarding, no organomegaly, no CVA tenderness, no hernia  Lymphatic: no lymphadenopathy noted  Skin: normal turgor, no rashes  Neurologic: grossly intact, oriented to person/place/time  Psychiatric: mode and affect appropriate    Procedure  Cystoscopy for hematuria  Patient's genitalia were prepped and draped in the usual sterile fashion. A 17 Turkmen flexible cystoscope was passed atraumatically per the urethra. The anterior and posterior urethra were normal.  There was moderate trilobar prostatic hypertrophy with intravesical extension and some calcification on the surface of the prostate.  The bladder was inspected. No tumors, clots, stones, or foreign bodies were identified. The right and left ureteral orifice were in their normal anatomic position and were effluxing clear urine. The scope was retroflexed and the bladder neck was unremarkable. The cystoscope was removed and the patient tolerated the procedure well.     Assessment/Plan   Problem List Items Addressed This Visit             ICD-10-CM    Enlarged prostate with lower urinary tract symptoms (LUTS) - Primary N40.1    Relevant Orders    Consult to Interventional Radiology    Gross hematuria R31.0    Relevant Medications    lidocaine 2 % mucosal jelly (Uro-Jet) 1 Application (Completed)    Other Relevant Orders    POCT UA Automated manually  resulted (Completed)    Cystourethroscopy (Completed)    Consult to Interventional Radiology     The patient tolerated the cystoscopy procedure well.  Cystoscopy findings of bilobar prostatic hypertrophy. I explained that there is a lot of prostatic regrowth. I recommend HoLEP. He would require clearance from Dr. Estevez to be off the blood thinner for ~2 weeks. In the meantime the patient could try finasteride to shrink the prostate.     We discussed the risks, benefits, and alternatives to the HoLEP procedure including embolization of the prostate. We also discussed the postop care and recovery expectations.  All questions and concerns were addressed. Patient verbalizes understanding and has no other questions at this time.      After a lengthy discussion, we will refer the patient for a prostate embolization with Interventional Radiology. The patient will start taking finasteride 5 mg daily. We will see the patient 1 month after the prostatic embolization.     Scribe Attestation  By signing my name below, I, Luz Maria Vyas , Scrgrayson   attest that this documentation has been prepared under the direction and in the presence of Madi Keene MD.

## 2024-09-25 ENCOUNTER — PREP FOR PROCEDURE (OUTPATIENT)
Dept: RADIOLOGY | Facility: HOSPITAL | Age: 64
End: 2024-09-25
Payer: COMMERCIAL

## 2024-09-25 DIAGNOSIS — I10 BENIGN ESSENTIAL HYPERTENSION: Primary | ICD-10-CM

## 2024-09-25 DIAGNOSIS — N40.1 BENIGN PROSTATIC HYPERPLASIA WITH LOWER URINARY TRACT SYMPTOMS, SYMPTOM DETAILS UNSPECIFIED: ICD-10-CM

## 2024-09-26 DIAGNOSIS — N13.8 BPH WITH OBSTRUCTION/LOWER URINARY TRACT SYMPTOMS: Primary | ICD-10-CM

## 2024-09-26 DIAGNOSIS — N40.1 BPH WITH OBSTRUCTION/LOWER URINARY TRACT SYMPTOMS: Primary | ICD-10-CM

## 2024-10-01 ENCOUNTER — HOSPITAL ENCOUNTER (OUTPATIENT)
Dept: RADIOLOGY | Facility: HOSPITAL | Age: 64
Discharge: HOME | End: 2024-10-01
Payer: COMMERCIAL

## 2024-10-01 VITALS
DIASTOLIC BLOOD PRESSURE: 82 MMHG | HEART RATE: 72 BPM | RESPIRATION RATE: 16 BRPM | OXYGEN SATURATION: 94 % | SYSTOLIC BLOOD PRESSURE: 175 MMHG | TEMPERATURE: 99.9 F

## 2024-10-01 DIAGNOSIS — R31.0 GROSS HEMATURIA: ICD-10-CM

## 2024-10-01 DIAGNOSIS — N40.1 BENIGN PROSTATIC HYPERPLASIA WITH WEAK URINARY STREAM: ICD-10-CM

## 2024-10-01 DIAGNOSIS — R39.12 BENIGN PROSTATIC HYPERPLASIA WITH WEAK URINARY STREAM: ICD-10-CM

## 2024-10-01 ASSESSMENT — PAIN SCALES - GENERAL: PAINLEVEL_OUTOF10: 0 - NO PAIN

## 2024-10-01 ASSESSMENT — PAIN - FUNCTIONAL ASSESSMENT: PAIN_FUNCTIONAL_ASSESSMENT: 0-10

## 2024-10-03 DIAGNOSIS — I82.4Y2 ACUTE DEEP VEIN THROMBOSIS (DVT) OF PROXIMAL VEIN OF LEFT LOWER EXTREMITY (MULTI): ICD-10-CM

## 2024-10-04 DIAGNOSIS — I82.4Y2 ACUTE DEEP VEIN THROMBOSIS (DVT) OF PROXIMAL VEIN OF LEFT LOWER EXTREMITY (MULTI): ICD-10-CM

## 2024-10-04 RX ORDER — APIXABAN 5 MG (74)
KIT ORAL
Qty: 74 TABLET | Refills: 0 | Status: CANCELLED | OUTPATIENT
Start: 2024-10-04

## 2024-10-04 RX ORDER — APIXABAN 5 MG (74)
KIT ORAL
Qty: 74 TABLET | Refills: 0 | Status: SHIPPED | OUTPATIENT
Start: 2024-10-04

## 2024-10-04 RX ORDER — APIXABAN 5 MG/1
TABLET, FILM COATED ORAL
Qty: 60 TABLET | Refills: 0 | OUTPATIENT
Start: 2024-10-04

## 2024-10-05 ENCOUNTER — OFFICE VISIT (OUTPATIENT)
Dept: URGENT CARE | Age: 64
End: 2024-10-05
Payer: COMMERCIAL

## 2024-10-05 VITALS
DIASTOLIC BLOOD PRESSURE: 76 MMHG | HEART RATE: 78 BPM | SYSTOLIC BLOOD PRESSURE: 162 MMHG | RESPIRATION RATE: 16 BRPM | OXYGEN SATURATION: 97 %

## 2024-10-05 DIAGNOSIS — R69 ILLNESS: ICD-10-CM

## 2024-10-05 DIAGNOSIS — N39.0 URINARY TRACT INFECTION WITH HEMATURIA, SITE UNSPECIFIED: Primary | ICD-10-CM

## 2024-10-05 DIAGNOSIS — R31.9 URINARY TRACT INFECTION WITH HEMATURIA, SITE UNSPECIFIED: Primary | ICD-10-CM

## 2024-10-05 DIAGNOSIS — R30.0 DYSURIA: ICD-10-CM

## 2024-10-05 DIAGNOSIS — R31.9 HEMATURIA, UNSPECIFIED TYPE: ICD-10-CM

## 2024-10-05 LAB
POC BILIRUBIN, URINE: ABNORMAL
POC BLOOD, URINE: ABNORMAL
POC GLUCOSE, URINE: NEGATIVE MG/DL
POC KETONES, URINE: ABNORMAL MG/DL
POC LEUKOCYTES, URINE: ABNORMAL
POC NITRITE,URINE: NEGATIVE
POC PH, URINE: 5.5 PH
POC PROTEIN, URINE: ABNORMAL MG/DL
POC SPECIFIC GRAVITY, URINE: 1.02
POC UROBILINOGEN, URINE: 0.2 EU/DL

## 2024-10-05 PROCEDURE — 87086 URINE CULTURE/COLONY COUNT: CPT

## 2024-10-05 RX ORDER — SULFAMETHOXAZOLE AND TRIMETHOPRIM 800; 160 MG/1; MG/1
1 TABLET ORAL 2 TIMES DAILY
Qty: 14 TABLET | Refills: 0 | Status: SHIPPED | OUTPATIENT
Start: 2024-10-05 | End: 2024-10-12

## 2024-10-06 NOTE — PATIENT INSTRUCTIONS
You were seen for urinary symptoms.    Urine dip: Suspicious for UTI (urinary tract infection)  Urine culture: Pending.  You will be notified if your antibiotic needs to be changed based on sensitivity results.    UTI    Plan:  1. Discharge home.  2. Take all medication as directed: Antibiotic.     3. Drink plenty of liquids such as sugar free cranberry juice  4. Follow up with your PCP in 2-3 days to discuss urine culture results and for reevaluation.    If you do not have a primary care physician please call the  appointment hotline in order to establish care. You can make an appointment by calling [phone].    If your urine culture results are negative for bacterial growth, are contaminated, or if you are still having symptoms despite treatment, or if your symptoms are lasting longer than expected; I advise follow up with your PCP to discuss test results and to have a reevaluation in order to rule out any other causes of your symptoms.    5. Return to the UC or ED immediately if new or worsening symptoms develop    Some approaches to preventing urinary tract infections are:  -Drinking more fluids  -Drinking cranberry juice  -Avoid bubble baths  -Encouraging females to wipe with toilet paper from front to back.      Go to the emergency department if you have any new or worsening symptoms.  -Worsening nausea and vomiting  -The inability to urinate  -Vaginal discharge, irritation or lesions  -Fever that lasts more than 2 days while taking an antibiotic  -Rash or swelling following an antibiotic  -Worsening back pain, fever, chills          You have signs and symptoms consistent with an uncomplicated ``urinary tract infection´´ or ``cystitis´´ caused by a bacteria. Many different symptoms such as urinary frequency, discomfort with urination or increased urinary urge to void are all common of this condition.     Please follow the treatment recommendations as outlined.    Expectations:  We would expect your  problem to improve within 7 days of starting treatment. If it does not improve or worsens over the next 3-5 days then we recommend you seek medical attention from a health care professional, as you can develop complications from urinary tract infections in some circumstances. Please read through the following treatment plan and counseling as it provides important information about your treatment course and what to expect.    Recommendations:   (1) Drink extra fluids - please stay hydrated and drink plenty of fluids. It is important to stay hydrated as your body is using extra fluids to fight off the infection. The institute of Medicine determined that on average men need at least 13 cups of fluids from beverages per day while women need 9 cups.  If you have any chronic lung, kidney or heart conditions you should check with your doctor before increasing your fluid intake.   (2)  Cranberry juice has been found to have a limited role in prevention of a urinary tract infection but does not treat or resolve active urinary tract infections. You may drink cranberry juice if it makes you feel more comfortable.  (3) Avoid tight fitting undergarments and get plenty of rest. Let your body dictate how active you should be. If you feel fatigued be sure to rest and allow your body to fight the infection.    Follow up:   If your symptoms worsen  or do not improve after 3 to 5 days, please contact a health care provider or to the emergency room. Specifically, if you notice any of the following symptoms we recommend you seek immediate medical attention: sustained fever greater than 100.4 degrees Fahrenheit despite use of anti-fever drugs such as acetaminophen or ibuprofen, flank or lower back pain, pelvic pain, uncontrollable shaking, feeling like you are lightheaded or may ``pass out´´/lose consciousness, vaginal discharge/odor/itching, pain with sexual intercourse, noticeable blood in urine, nausea, vomiting or abdominal  pain.    Prevention:  (1) The best way to avoid UTIs is to practice good hygiene. In addition to appropriate hand-washing, always wipe ``front-to-back´´ after urinating and with bowel movements. This helps avoid introducing any bacteria to your urinary tract from your stool/feces.   (2) Try to avoid ``holding it´´ and pee or urinate regularly, ideally every 3-4 hours during the day. (3) At night, wear loose fitting clothing.  (4) After sexual intercourse empty your bladder to prevent infection. These measures can help prevent another UTI from occurring.

## 2024-10-06 NOTE — PROGRESS NOTES
"Subjective   Patient ID: Omar Gold \"Juan J\" is a 64 y.o. male. They present today with a chief complaint of Difficulty Urinating (X 3 days).    CC: Dysuria    HPI: Patient presenting for dysuria that started a few days ago.  Reports that he had a cystoscopy about 10 days ago and prostate issues.  No back or flank pain.  No fever.  No abdominal pain, nausea, vomiting.  No other concerns or complaints.    Past Medical History  Allergies as of 10/05/2024    (No Known Allergies)       (Not in a hospital admission)         Past Medical History:   Diagnosis Date    Other pulmonary embolism without acute cor pulmonale (Multi) 07/24/2017    Pulmonary embolism    Personal history of other diseases of urinary system     History of bladder stone    Personal history of other specified conditions 07/24/2017    History of abdominal pain    Personal history of other venous thrombosis and embolism 11/21/2019    History of deep vein thrombosis (DVT) of lower extremity    Personal history of other venous thrombosis and embolism 07/24/2017    History of deep venous thrombosis    Strain of muscle, fascia and tendon of lower back, initial encounter 05/22/2020    Lumbar strain       Past Surgical History:   Procedure Laterality Date    CHOLECYSTECTOMY  07/24/2017    Cholecystectomy    COLONOSCOPY  06/23/2023    OTHER SURGICAL HISTORY  06/26/2013    ERCP With Biopsy    OTHER SURGICAL HISTORY  07/24/2017    Incisional Hernia Repair    OTHER SURGICAL HISTORY  12/07/2017    Cholelithotomy    REFRACTIVE SURGERY  08/09/2018    Corneal LASIK    TRANSURETHRAL RESECTION OF PROSTATE  06/26/2013    Transurethral Resection Of Prostate (TURP)        reports that he has never smoked. He has never used smokeless tobacco. He reports that he does not drink alcohol and does not use drugs.    Review of Systems  Review of Systems      After reviewing all body systems I have documented pertinent findings above in the history.  All other Systems reviewed " and are negative for complaint.  Pertinent positive and negatives are listed in the above HPI.        Objective    Vitals:    10/05/24 2009   BP: 162/76   Pulse: 78   Resp: 16   SpO2: 97%     No LMP for male patient.    Physical Exam    General: Alert, oriented, and cooperative.  No acute distress. Well developed, well nourished.     Skin: Skin is warm, and dry. No rashes or lesions.    Eyes: Sclera and conjunctivae normal     Neck: Supple.     Cardiac: Regular rate and rhythm    Respiratory:  No acute respiratory distress.  Regular rate of breathing.  No accessory muscle use.  No tripoding.      Abdomen: Soft, nontender.  No CVA tenderness.    Procedures    Point of Care Test & Imaging Results from this visit  Results for orders placed or performed in visit on 10/05/24   POCT UA Automated manually resulted   Result Value Ref Range    POC Glucose, Urine NEGATIVE NEGATIVE mg/dl    POC Bilirubin, Urine SMALL (1+) (A) NEGATIVE    POC Ketones, Urine TRACE (A) NEGATIVE mg/dl    POC Specific Gravity, Urine 1.025 1.005 - 1.035    POC Blood, Urine LARGE (3+) (A) NEGATIVE    POC PH, Urine 5.5 No Reference Range Established PH    POC Protein, Urine 15 (1+) (A) NEGATIVE, 30 (1+) mg/dl    POC Urobilinogen, Urine 0.2 0.2, 1.0 EU/DL    Poc Nitrite, Urine NEGATIVE NEGATIVE    POC Leukocytes, Urine LARGE (3+) (A) NEGATIVE     No results found.    Diagnostic study results (if any) were reviewed by Benji Mcdonald PA-C.    Assessment/Plan   Allergies, medications, history, and pertinent labs/EKGs/Imaging reviewed by Benji Mcdonald PA-C.       MDM:  Patient presenting for UTI type symptoms.    Reports no concerns for STD.  No reported nausea or vomiting. Denied flank and back pain. Denies  lesions, penile discharge, rash, or tenderness.     Patient overall looks well.  Does not appear systemically ill or toxic.  No abdominal tenderness, guarding or rebound. No CVA tenderness.     Urine: Dip: Suspicious for UTI  Urine culture:  Pending      No clinical findings to suggest Pyelonephritis or Urinary Stone.    Low concerns for STD, PID, HSV, or irritant vaginitis.     Patient will be empirically treated for UTI.  Advised to take all medications as prescribed. Advised follow-up with PCP for reevaluation. Pt/family instructed to return if symptoms worsen or if new symptoms develop. Patient/family expressed understanding and consented to the above plan. No barriers of communication were apparent and I answered all questions.          Orders and Diagnoses  Diagnoses and all orders for this visit:  Urinary tract infection with hematuria, site unspecified  Illness  -     POCT UA Automated manually resulted  Hematuria, unspecified type  Dysuria          Patient disposition: Home    Electronically signed by Benji Mcdonald PA-C  8:25 PM

## 2024-10-07 LAB — BACTERIA UR CULT: NORMAL

## 2024-10-08 ENCOUNTER — APPOINTMENT (OUTPATIENT)
Dept: UROLOGY | Facility: CLINIC | Age: 64
End: 2024-10-08
Payer: COMMERCIAL

## 2024-10-22 ENCOUNTER — TELEPHONE (OUTPATIENT)
Dept: UROLOGY | Facility: CLINIC | Age: 64
End: 2024-10-22
Payer: COMMERCIAL

## 2024-10-22 ENCOUNTER — APPOINTMENT (OUTPATIENT)
Dept: RADIOLOGY | Facility: HOSPITAL | Age: 64
End: 2024-10-22
Payer: COMMERCIAL

## 2024-10-22 DIAGNOSIS — N13.8 BPH WITH OBSTRUCTION/LOWER URINARY TRACT SYMPTOMS: ICD-10-CM

## 2024-10-22 DIAGNOSIS — N40.1 BPH WITH OBSTRUCTION/LOWER URINARY TRACT SYMPTOMS: ICD-10-CM

## 2024-10-22 RX ORDER — FINASTERIDE 5 MG/1
5 TABLET, FILM COATED ORAL DAILY
Qty: 30 TABLET | Refills: 11 | Status: SHIPPED | OUTPATIENT
Start: 2024-10-22 | End: 2025-10-17

## 2024-10-22 NOTE — TELEPHONE ENCOUNTER
Patient calling for a refill of Finestride and also has questions would like to discuss with the nurse.  Please advise

## 2024-10-28 DIAGNOSIS — I82.4Y2 ACUTE DEEP VEIN THROMBOSIS (DVT) OF PROXIMAL VEIN OF LEFT LOWER EXTREMITY (MULTI): ICD-10-CM

## 2024-10-28 RX ORDER — APIXABAN 5 MG (74)
KIT ORAL
Qty: 60 TABLET | Refills: 0 | OUTPATIENT
Start: 2024-10-28

## 2024-10-30 ENCOUNTER — APPOINTMENT (OUTPATIENT)
Dept: PRIMARY CARE | Facility: CLINIC | Age: 64
End: 2024-10-30
Payer: COMMERCIAL

## 2024-11-11 ENCOUNTER — LAB (OUTPATIENT)
Dept: LAB | Facility: LAB | Age: 64
End: 2024-11-11
Payer: COMMERCIAL

## 2024-11-11 DIAGNOSIS — N40.1 BPH WITH OBSTRUCTION/LOWER URINARY TRACT SYMPTOMS: ICD-10-CM

## 2024-11-11 DIAGNOSIS — N13.8 BPH WITH OBSTRUCTION/LOWER URINARY TRACT SYMPTOMS: ICD-10-CM

## 2024-11-11 PROCEDURE — 36415 COLL VENOUS BLD VENIPUNCTURE: CPT

## 2024-11-11 PROCEDURE — 84153 ASSAY OF PSA TOTAL: CPT

## 2024-11-12 LAB — PSA SERPL-MCNC: 1.65 NG/ML

## 2024-11-19 ENCOUNTER — APPOINTMENT (OUTPATIENT)
Dept: RADIOLOGY | Facility: HOSPITAL | Age: 64
End: 2024-11-19
Payer: COMMERCIAL

## 2024-12-13 ENCOUNTER — OFFICE VISIT (OUTPATIENT)
Dept: PRIMARY CARE | Facility: CLINIC | Age: 64
End: 2024-12-13
Payer: COMMERCIAL

## 2024-12-13 DIAGNOSIS — M54.50 ACUTE RIGHT-SIDED LOW BACK PAIN WITHOUT SCIATICA: Primary | ICD-10-CM

## 2024-12-13 DIAGNOSIS — E66.01 CLASS 3 SEVERE OBESITY WITH SERIOUS COMORBIDITY AND BODY MASS INDEX (BMI) OF 40.0 TO 44.9 IN ADULT, UNSPECIFIED OBESITY TYPE: ICD-10-CM

## 2024-12-13 DIAGNOSIS — T14.8XXA MUSCLE STRAIN: ICD-10-CM

## 2024-12-13 DIAGNOSIS — M79.18 MUSCULOSKELETAL PAIN: ICD-10-CM

## 2024-12-13 DIAGNOSIS — E66.813 CLASS 3 SEVERE OBESITY WITH SERIOUS COMORBIDITY AND BODY MASS INDEX (BMI) OF 40.0 TO 44.9 IN ADULT, UNSPECIFIED OBESITY TYPE: ICD-10-CM

## 2024-12-13 PROCEDURE — 99212 OFFICE O/P EST SF 10 MIN: CPT | Performed by: NURSE PRACTITIONER

## 2024-12-13 RX ORDER — METHOCARBAMOL 500 MG/1
500 TABLET, FILM COATED ORAL 4 TIMES DAILY PRN
Qty: 40 TABLET | Refills: 0 | Status: SHIPPED | OUTPATIENT
Start: 2024-12-13 | End: 2025-02-11

## 2024-12-13 ASSESSMENT — PATIENT HEALTH QUESTIONNAIRE - PHQ9
1. LITTLE INTEREST OR PLEASURE IN DOING THINGS: NOT AT ALL
SUM OF ALL RESPONSES TO PHQ9 QUESTIONS 1 AND 2: 0
2. FEELING DOWN, DEPRESSED OR HOPELESS: NOT AT ALL

## 2024-12-13 ASSESSMENT — PAIN SCALES - GENERAL: PAINLEVEL_OUTOF10: 5

## 2024-12-13 NOTE — PROGRESS NOTES
"Subjective   Patient ID: Omar Gold \"Juan J\" is a 64 y.o. male who is with chief complaint of back pain.    HPI  Patient is a 64 y.o. male who CONSULTED AT [facility] today. Patient is with complaint of back pain. Patient states condition started about 10 days ago after doing a lot of bending and lifting while wrapping a palette with plastic wrap. Patient states the pain is on the right side of his lower back, achy in character, 4-9/10 intermittent, aggravated by movement, and non radiating. he denies fever, chills, paresthesia, paralysis, nor change in the color of the skin or nails of involved extremity. he states he tried OTC medications which afforded only slight relief of symptoms.    Review of Systems  General: no weight loss, generally healthy, no fatigue  Head:  no headaches / sinus pain, no vertigo, no injury  Eyes: no diplopia, no tearing, no pain,   Ears: no change in hearing, no tinnitus, no bleeding, no vertigo  Mouth:  no dental difficulties, no gingival bleeding, no sore throat, no loss of sense of taste  Nose: no congestion, no  discharge, no bleeding, no obstruction, no loss of sense of smell  Neck: no stiffness, no pain, no tenderness, no masses, no bruit  Pulmonary: no dyspnea, no wheezing, no hemoptysis, no cough  Cardiovascular: no chest pain, no palpitations, no syncope, no orthopnea  Gastrointestinal: no change in appetite, no dysphagia, no abdominal pains, no diarrhea, no emesis, no melena  Genito Urinary: no dysuria, no urinary urgency, no nocturia, no incontinence, no change in nature of urine  Musculoskeletal: (+) right sided lower back pain, no limitation of range of motion, no paresthesia, no numbness  Constitutional: no fever, no chills, no night sweats    Objective   Physical Exam  General: ambulatory, in no acute distress  Head: normocephalic, no lesions  Neck: supple, no masses, no bruits  Musculoskeletal: no limitation of range of motion, no paralysis, no " deformity; BacK: (+) direct tenderness on the right paravertebral muscle area level of L3 to L5, negative straight  leg raise test on the right and left sides,   Extremities: full and equal peripheral pulses, no edema, < 2 seconds capillary refill on all toes    Assessment/Plan   Problem List Items Addressed This Visit    None  Visit Diagnoses         Codes    Acute right-sided low back pain without sciatica    -  Primary M54.50    Relevant Medications    methocarbamol (Robaxin) 500 mg tablet    Muscle strain     T14.8XXA    Relevant Medications    methocarbamol (Robaxin) 500 mg tablet    BMI 40.0-44.9, adult (Multi)     Z68.41    Musculoskeletal pain     M79.18    Relevant Medications    methocarbamol (Robaxin) 500 mg tablet    Class 3 severe obesity with serious comorbidity and body mass index (BMI) of 40.0 to 44.9 in adult, unspecified obesity type     E66.813, E66.01, Z68.41        DISCHARGE SUMMARY:   Patient was seen and examined. Diagnosis, treatment, treatment options, and possible complications of today's illness discussed and explained to patient. Patient to take medication/s associated with this visit.  Patient may use OTC menthol patches as needed for comfort. Advised stretching and warm up exercises as tolerated prior to more intense physical exertion / exercise.  Advised to avoid heavy lifting, pulling, or pushing for at least a week. Reinforce stretching and exercises that strengthen core muscles.     Patient to come back in 4 - 7 days if needed for worsening symptoms. Patient verbalized understanding of plan of care.           SUZI Coronel-CNP 12/13/24 11:59 AM

## 2024-12-13 NOTE — PROGRESS NOTES
"Subjective   Patient ID: Juan J Gold is a 64 y.o. male who presents for Back Pain.    Symptoms: lower right side back pain, sitting to standing cause discomfort, positions cause discomfort pain is dull and comes and goes.  Length of symptoms:  2 weeks ago  OTC: heat and tylenol with mild help.  Related information:    HPI     Review of Systems    Objective   /70   Pulse (!) 49   Temp 36.6 °C (97.8 °F)   Resp 16   Ht 1.778 m (5' 10\")   Wt 130 kg (286 lb)   SpO2 97%   BMI 41.04 kg/m²     Physical Exam    Assessment/Plan          "

## 2024-12-14 VITALS
OXYGEN SATURATION: 97 % | WEIGHT: 286 LBS | TEMPERATURE: 97.8 F | HEART RATE: 70 BPM | DIASTOLIC BLOOD PRESSURE: 70 MMHG | BODY MASS INDEX: 40.94 KG/M2 | HEIGHT: 70 IN | SYSTOLIC BLOOD PRESSURE: 130 MMHG | RESPIRATION RATE: 16 BRPM

## 2024-12-14 ASSESSMENT — ENCOUNTER SYMPTOMS
DEPRESSION: 0
OCCASIONAL FEELINGS OF UNSTEADINESS: 0
LOSS OF SENSATION IN FEET: 1

## 2024-12-31 ENCOUNTER — APPOINTMENT (OUTPATIENT)
Dept: RADIOLOGY | Facility: HOSPITAL | Age: 64
End: 2024-12-31
Payer: COMMERCIAL

## 2025-01-03 DIAGNOSIS — I82.4Y2 ACUTE DEEP VEIN THROMBOSIS (DVT) OF PROXIMAL VEIN OF LEFT LOWER EXTREMITY (MULTI): ICD-10-CM

## 2025-01-15 ENCOUNTER — LAB (OUTPATIENT)
Dept: LAB | Facility: LAB | Age: 65
End: 2025-01-15
Payer: COMMERCIAL

## 2025-01-15 DIAGNOSIS — Z00.00 ROUTINE GENERAL MEDICAL EXAMINATION AT A HEALTH CARE FACILITY: ICD-10-CM

## 2025-01-15 DIAGNOSIS — E11.9 TYPE 2 DIABETES MELLITUS WITHOUT COMPLICATION, WITHOUT LONG-TERM CURRENT USE OF INSULIN (MULTI): ICD-10-CM

## 2025-01-15 DIAGNOSIS — R53.83 OTHER FATIGUE: ICD-10-CM

## 2025-01-15 PROBLEM — I26.99 PULMONARY EMBOLISM: Status: ACTIVE | Noted: 2025-01-15

## 2025-01-15 PROBLEM — E66.01 MORBID OBESITY (MULTI): Status: ACTIVE | Noted: 2025-01-15

## 2025-01-15 LAB
ALBUMIN SERPL BCP-MCNC: 4 G/DL (ref 3.4–5)
ALP SERPL-CCNC: 97 U/L (ref 33–136)
ALT SERPL W P-5'-P-CCNC: 18 U/L (ref 10–52)
ANION GAP SERPL CALC-SCNC: 13 MMOL/L (ref 10–20)
AST SERPL W P-5'-P-CCNC: 17 U/L (ref 9–39)
BILIRUB SERPL-MCNC: 0.6 MG/DL (ref 0–1.2)
BUN SERPL-MCNC: 26 MG/DL (ref 6–23)
CALCIUM SERPL-MCNC: 9.3 MG/DL (ref 8.6–10.6)
CHLORIDE SERPL-SCNC: 101 MMOL/L (ref 98–107)
CHOLEST SERPL-MCNC: 133 MG/DL (ref 0–199)
CHOLESTEROL/HDL RATIO: 3
CO2 SERPL-SCNC: 30 MMOL/L (ref 21–32)
CREAT SERPL-MCNC: 0.97 MG/DL (ref 0.5–1.3)
EGFRCR SERPLBLD CKD-EPI 2021: 87 ML/MIN/1.73M*2
ERYTHROCYTE [DISTWIDTH] IN BLOOD BY AUTOMATED COUNT: 11.8 % (ref 11.5–14.5)
EST. AVERAGE GLUCOSE BLD GHB EST-MCNC: 140 MG/DL
GLUCOSE SERPL-MCNC: 156 MG/DL (ref 74–99)
HBA1C MFR BLD: 6.5 %
HCT VFR BLD AUTO: 43.7 % (ref 41–52)
HDLC SERPL-MCNC: 44.5 MG/DL
HGB BLD-MCNC: 15.5 G/DL (ref 13.5–17.5)
LDLC SERPL CALC-MCNC: 72 MG/DL
MCH RBC QN AUTO: 32.9 PG (ref 26–34)
MCHC RBC AUTO-ENTMCNC: 35.5 G/DL (ref 32–36)
MCV RBC AUTO: 93 FL (ref 80–100)
NON HDL CHOLESTEROL: 89 MG/DL (ref 0–149)
NRBC BLD-RTO: 0 /100 WBCS (ref 0–0)
PLATELET # BLD AUTO: 265 X10*3/UL (ref 150–450)
POTASSIUM SERPL-SCNC: 3.8 MMOL/L (ref 3.5–5.3)
PROT SERPL-MCNC: 6.6 G/DL (ref 6.4–8.2)
RBC # BLD AUTO: 4.71 X10*6/UL (ref 4.5–5.9)
SODIUM SERPL-SCNC: 140 MMOL/L (ref 136–145)
TRIGL SERPL-MCNC: 84 MG/DL (ref 0–149)
TSH SERPL-ACNC: 2.98 MIU/L (ref 0.44–3.98)
VLDL: 17 MG/DL (ref 0–40)
WBC # BLD AUTO: 7.4 X10*3/UL (ref 4.4–11.3)

## 2025-01-15 PROCEDURE — 80053 COMPREHEN METABOLIC PANEL: CPT

## 2025-01-15 PROCEDURE — 83036 HEMOGLOBIN GLYCOSYLATED A1C: CPT

## 2025-01-15 PROCEDURE — 84402 ASSAY OF FREE TESTOSTERONE: CPT

## 2025-01-15 PROCEDURE — 36415 COLL VENOUS BLD VENIPUNCTURE: CPT

## 2025-01-15 PROCEDURE — 85027 COMPLETE CBC AUTOMATED: CPT

## 2025-01-15 PROCEDURE — 80061 LIPID PANEL: CPT

## 2025-01-15 PROCEDURE — 84443 ASSAY THYROID STIM HORMONE: CPT

## 2025-01-16 ENCOUNTER — APPOINTMENT (OUTPATIENT)
Dept: PRIMARY CARE | Facility: CLINIC | Age: 65
End: 2025-01-16
Payer: COMMERCIAL

## 2025-01-16 VITALS
BODY MASS INDEX: 41.06 KG/M2 | OXYGEN SATURATION: 95 % | WEIGHT: 286.8 LBS | HEART RATE: 69 BPM | HEIGHT: 70 IN | SYSTOLIC BLOOD PRESSURE: 161 MMHG | DIASTOLIC BLOOD PRESSURE: 77 MMHG | TEMPERATURE: 96.8 F

## 2025-01-16 DIAGNOSIS — N40.1 BENIGN PROSTATIC HYPERPLASIA WITH INCOMPLETE BLADDER EMPTYING: ICD-10-CM

## 2025-01-16 DIAGNOSIS — Z13.6 ENCOUNTER FOR SCREENING FOR CARDIOVASCULAR DISORDERS: ICD-10-CM

## 2025-01-16 DIAGNOSIS — I27.82 OTHER CHRONIC PULMONARY EMBOLISM WITHOUT ACUTE COR PULMONALE: ICD-10-CM

## 2025-01-16 DIAGNOSIS — I10 BENIGN ESSENTIAL HYPERTENSION: Primary | ICD-10-CM

## 2025-01-16 DIAGNOSIS — E66.01 MORBID OBESITY WITH BMI OF 40.0-44.9, ADULT (MULTI): ICD-10-CM

## 2025-01-16 DIAGNOSIS — E11.9 TYPE 2 DIABETES MELLITUS WITHOUT COMPLICATION, WITHOUT LONG-TERM CURRENT USE OF INSULIN (MULTI): ICD-10-CM

## 2025-01-16 DIAGNOSIS — R39.14 BENIGN PROSTATIC HYPERPLASIA WITH INCOMPLETE BLADDER EMPTYING: ICD-10-CM

## 2025-01-16 DIAGNOSIS — Z00.00 ROUTINE GENERAL MEDICAL EXAMINATION AT A HEALTH CARE FACILITY: ICD-10-CM

## 2025-01-16 PROBLEM — R73.09 ELEVATED GLUCOSE LEVEL: Status: RESOLVED | Noted: 2023-04-10 | Resolved: 2025-01-16

## 2025-01-16 PROBLEM — R31.0 GROSS HEMATURIA: Status: RESOLVED | Noted: 2024-09-24 | Resolved: 2025-01-16

## 2025-01-16 PROBLEM — R39.198 OTHER DIFFICULTIES WITH MICTURITION: Status: RESOLVED | Noted: 2023-04-10 | Resolved: 2025-01-16

## 2025-01-16 PROCEDURE — 90472 IMMUNIZATION ADMIN EACH ADD: CPT | Performed by: INTERNAL MEDICINE

## 2025-01-16 PROCEDURE — 90750 HZV VACC RECOMBINANT IM: CPT | Performed by: INTERNAL MEDICINE

## 2025-01-16 PROCEDURE — 3077F SYST BP >= 140 MM HG: CPT | Performed by: INTERNAL MEDICINE

## 2025-01-16 PROCEDURE — 3008F BODY MASS INDEX DOCD: CPT | Performed by: INTERNAL MEDICINE

## 2025-01-16 PROCEDURE — 90471 IMMUNIZATION ADMIN: CPT | Performed by: INTERNAL MEDICINE

## 2025-01-16 PROCEDURE — 3044F HG A1C LEVEL LT 7.0%: CPT | Performed by: INTERNAL MEDICINE

## 2025-01-16 PROCEDURE — 3078F DIAST BP <80 MM HG: CPT | Performed by: INTERNAL MEDICINE

## 2025-01-16 PROCEDURE — 90677 PCV20 VACCINE IM: CPT | Performed by: INTERNAL MEDICINE

## 2025-01-16 PROCEDURE — 1036F TOBACCO NON-USER: CPT | Performed by: INTERNAL MEDICINE

## 2025-01-16 PROCEDURE — 93000 ELECTROCARDIOGRAM COMPLETE: CPT | Performed by: INTERNAL MEDICINE

## 2025-01-16 PROCEDURE — 99396 PREV VISIT EST AGE 40-64: CPT | Performed by: INTERNAL MEDICINE

## 2025-01-16 PROCEDURE — 3048F LDL-C <100 MG/DL: CPT | Performed by: INTERNAL MEDICINE

## 2025-01-16 ASSESSMENT — ENCOUNTER SYMPTOMS
NAUSEA: 0
ACTIVITY CHANGE: 0
ABDOMINAL PAIN: 0
ARTHRALGIAS: 0
CHILLS: 0
BACK PAIN: 0
PALPITATIONS: 0
APPETITE CHANGE: 0
BLOOD IN STOOL: 0
FATIGUE: 0
PHOTOPHOBIA: 0
DIZZINESS: 0
DIARRHEA: 0
FEVER: 0
COUGH: 0
TREMORS: 0
DIFFICULTY URINATING: 0
SLEEP DISTURBANCE: 0
NECK PAIN: 0
SHORTNESS OF BREATH: 0
SORE THROAT: 0
FREQUENCY: 0
CHEST TIGHTNESS: 0
DYSURIA: 0
CONSTIPATION: 0
TROUBLE SWALLOWING: 0

## 2025-01-16 NOTE — PROGRESS NOTES
"Subjective   Patient ID: Omar Gold \"Juan J\" is a 64 y.o. male who presents for Annual Exam.    64 y.o. here for a AMWV/physical  Has been feeling well  Has had a rough 2024  His urinary symptoms are slowly improving  Hurt his back at work again in Dec  Has gained weight  Starting to exercise again now    Has a rash on his abs - itchy and scaly  Has a skin tag on his eye  HPI      Employment - Inventory  Children - 3  Grandchildren - 6    Tob - Nonsmoker  Alcohol - none  Marijuana  - denies  Exercise - 3-4  days a week     Review of Systems   Constitutional:  Negative for activity change, appetite change, chills, fatigue and fever.   HENT:  Negative for congestion, ear pain, sore throat, tinnitus and trouble swallowing.    Eyes:  Negative for photophobia and visual disturbance.   Respiratory:  Negative for cough, chest tightness and shortness of breath.    Cardiovascular:  Negative for chest pain, palpitations and leg swelling.   Gastrointestinal:  Negative for abdominal pain, blood in stool, constipation, diarrhea and nausea.   Genitourinary:  Negative for difficulty urinating, dysuria, frequency, genital sores, testicular pain and urgency.   Musculoskeletal:  Negative for arthralgias, back pain, gait problem and neck pain.   Skin:  Negative for rash.   Neurological:  Negative for dizziness and tremors.   Psychiatric/Behavioral:  Negative for sleep disturbance.    All other systems reviewed and are negative.      Objective   Vitals:    01/16/25 0741   BP: 161/77   Pulse: 69   Temp: 36 °C (96.8 °F)   SpO2: 95%     Physical Exam  Constitutional:       General: He is not in acute distress.     Appearance: He is well-developed. He is not diaphoretic.   HENT:      Head: Normocephalic.      Right Ear: Tympanic membrane normal. There is no impacted cerumen.      Left Ear: Tympanic membrane normal. There is no impacted cerumen.      Nose: Nose normal.      Mouth/Throat:      Mouth: Mucous membranes are moist.     "  Pharynx: Oropharynx is clear. No oropharyngeal exudate or posterior oropharyngeal erythema.   Eyes:      General: No scleral icterus.     Extraocular Movements: Extraocular movements intact.      Conjunctiva/sclera: Conjunctivae normal.      Pupils: Pupils are equal, round, and reactive to light.   Neck:      Thyroid: No thyromegaly.      Vascular: No JVD.   Cardiovascular:      Rate and Rhythm: Normal rate and regular rhythm.      Pulses: Normal pulses.      Heart sounds: Normal heart sounds. No murmur heard.     No friction rub. No gallop.   Pulmonary:      Effort: Pulmonary effort is normal. No respiratory distress.      Breath sounds: Normal breath sounds. No wheezing or rales.   Chest:      Chest wall: No tenderness.   Abdominal:      General: Bowel sounds are normal. There is no distension.      Palpations: Abdomen is soft. There is no mass.      Tenderness: There is no abdominal tenderness. There is no rebound.   Musculoskeletal:         General: Normal range of motion.      Cervical back: Normal range of motion and neck supple.   Lymphadenopathy:      Cervical: No cervical adenopathy.   Skin:     General: Skin is warm and dry.   Neurological:      General: No focal deficit present.      Mental Status: He is alert and oriented to person, place, and time.      Deep Tendon Reflexes: Reflexes normal.   Psychiatric:         Mood and Affect: Mood normal.         Thought Content: Thought content normal.       Assessment/Plan   Problem List Items Addressed This Visit       Benign essential hypertension - Primary    Type 2 diabetes mellitus without complication, without long-term current use of insulin (Multi)    Current Assessment & Plan     On Metformin  Pt wants to work on diet and exercise  Conisder Mounjaro if A1C still elevated in 3 months         Relevant Orders    Referral to Ophthalmology    Enlarged prostate with lower urinary tract symptoms (LUTS)    Current Assessment & Plan     Sees urology          Morbid obesity with BMI of 40.0-44.9, adult (Multi)    Current Assessment & Plan     Discussed diet and exercise         Pulmonary embolism    Overview     Comment on above: Added by Problem List Migration; 2013-2-26; Moved to [facility] Nov 7 2013 9:20PM;         Current Assessment & Plan     On life long anticoagulation               Follow up with me in 3 months

## 2025-01-16 NOTE — ASSESSMENT & PLAN NOTE
On Metformin  Pt wants to work on diet and exercise  Conisder Mounjaro if A1C still elevated in 3 months

## 2025-01-20 LAB
TESTOSTERONE FREE (CHAN): 60 PG/ML (ref 35–155)
TESTOSTERONE,TOTAL,LC-MS/MS: 411 NG/DL (ref 250–1100)

## 2025-02-25 ENCOUNTER — HOSPITAL ENCOUNTER (OUTPATIENT)
Dept: RADIOLOGY | Facility: CLINIC | Age: 65
Discharge: HOME | End: 2025-02-25
Payer: COMMERCIAL

## 2025-02-25 DIAGNOSIS — Z13.6 ENCOUNTER FOR SCREENING FOR CARDIOVASCULAR DISORDERS: ICD-10-CM

## 2025-02-25 DIAGNOSIS — J98.59 MEDIASTINAL MASS: Primary | ICD-10-CM

## 2025-02-25 DIAGNOSIS — E78.5 HYPERLIPIDEMIA, UNSPECIFIED HYPERLIPIDEMIA TYPE: Primary | ICD-10-CM

## 2025-02-25 PROCEDURE — 75571 CT HRT W/O DYE W/CA TEST: CPT

## 2025-02-25 RX ORDER — ATORVASTATIN CALCIUM 20 MG/1
20 TABLET, FILM COATED ORAL DAILY
Qty: 100 TABLET | Refills: 3 | Status: SHIPPED | OUTPATIENT
Start: 2025-02-25 | End: 2026-04-01

## 2025-03-04 ENCOUNTER — TELEPHONE (OUTPATIENT)
Dept: UROLOGY | Facility: CLINIC | Age: 65
End: 2025-03-04
Payer: COMMERCIAL

## 2025-03-04 DIAGNOSIS — R35.0 BENIGN PROSTATIC HYPERPLASIA WITH URINARY FREQUENCY: ICD-10-CM

## 2025-03-04 DIAGNOSIS — N40.1 BENIGN PROSTATIC HYPERPLASIA WITH URINARY FREQUENCY: ICD-10-CM

## 2025-03-04 RX ORDER — TADALAFIL 5 MG/1
5 TABLET ORAL DAILY
Qty: 90 TABLET | Refills: 0 | Status: SHIPPED | OUTPATIENT
Start: 2025-03-04 | End: 2025-06-02

## 2025-03-07 DIAGNOSIS — I82.4Y2 ACUTE DEEP VEIN THROMBOSIS (DVT) OF PROXIMAL VEIN OF LEFT LOWER EXTREMITY (MULTI): ICD-10-CM

## 2025-03-08 RX ORDER — APIXABAN 5 MG/1
5 TABLET, FILM COATED ORAL 2 TIMES DAILY
Qty: 60 TABLET | Refills: 0 | Status: SHIPPED | OUTPATIENT
Start: 2025-03-08

## 2025-03-10 ENCOUNTER — APPOINTMENT (OUTPATIENT)
Dept: RADIOLOGY | Facility: HOSPITAL | Age: 65
End: 2025-03-10
Payer: COMMERCIAL

## 2025-03-27 ENCOUNTER — HOSPITAL ENCOUNTER (OUTPATIENT)
Dept: RADIOLOGY | Facility: HOSPITAL | Age: 65
Discharge: HOME | End: 2025-03-27
Payer: COMMERCIAL

## 2025-03-27 DIAGNOSIS — J98.59 MEDIASTINAL MASS: ICD-10-CM

## 2025-03-27 PROCEDURE — 71552 MRI CHEST W/O & W/DYE: CPT

## 2025-03-27 PROCEDURE — 2550000001 HC RX 255 CONTRASTS: Performed by: INTERNAL MEDICINE

## 2025-03-27 PROCEDURE — A9575 INJ GADOTERATE MEGLUMI 0.1ML: HCPCS | Performed by: INTERNAL MEDICINE

## 2025-03-27 RX ORDER — GADOTERATE MEGLUMINE 376.9 MG/ML
30 INJECTION INTRAVENOUS
Status: COMPLETED | OUTPATIENT
Start: 2025-03-27 | End: 2025-03-27

## 2025-03-27 RX ADMIN — GADOTERATE MEGLUMINE 26 ML: 376.9 INJECTION INTRAVENOUS at 08:48

## 2025-04-07 DIAGNOSIS — I82.4Y2 ACUTE DEEP VEIN THROMBOSIS (DVT) OF PROXIMAL VEIN OF LEFT LOWER EXTREMITY: ICD-10-CM

## 2025-04-07 NOTE — TELEPHONE ENCOUNTER
Pt advised he is out of his Eliquis and needs to have it sent to [facility] with refills. Call w/questions

## 2025-04-21 ENCOUNTER — APPOINTMENT (OUTPATIENT)
Dept: PRIMARY CARE | Facility: CLINIC | Age: 65
End: 2025-04-21
Payer: COMMERCIAL

## 2025-04-21 VITALS
DIASTOLIC BLOOD PRESSURE: 75 MMHG | BODY MASS INDEX: 42.98 KG/M2 | HEIGHT: 69 IN | SYSTOLIC BLOOD PRESSURE: 137 MMHG | TEMPERATURE: 96.7 F | OXYGEN SATURATION: 95 % | WEIGHT: 290.2 LBS | HEART RATE: 61 BPM

## 2025-04-21 DIAGNOSIS — E11.9 TYPE 2 DIABETES MELLITUS WITHOUT COMPLICATION, WITHOUT LONG-TERM CURRENT USE OF INSULIN: Primary | ICD-10-CM

## 2025-04-21 DIAGNOSIS — N40.1 BPH WITH OBSTRUCTION/LOWER URINARY TRACT SYMPTOMS: ICD-10-CM

## 2025-04-21 DIAGNOSIS — I10 BENIGN ESSENTIAL HYPERTENSION: ICD-10-CM

## 2025-04-21 DIAGNOSIS — N13.8 BPH WITH OBSTRUCTION/LOWER URINARY TRACT SYMPTOMS: ICD-10-CM

## 2025-04-21 DIAGNOSIS — I27.82 OTHER CHRONIC PULMONARY EMBOLISM WITHOUT ACUTE COR PULMONALE: ICD-10-CM

## 2025-04-21 DIAGNOSIS — R35.0 BENIGN PROSTATIC HYPERPLASIA WITH URINARY FREQUENCY: ICD-10-CM

## 2025-04-21 DIAGNOSIS — N40.1 BENIGN PROSTATIC HYPERPLASIA WITH URINARY FREQUENCY: ICD-10-CM

## 2025-04-21 DIAGNOSIS — N40.1 BENIGN PROSTATIC HYPERPLASIA WITH INCOMPLETE BLADDER EMPTYING: ICD-10-CM

## 2025-04-21 DIAGNOSIS — R39.14 BENIGN PROSTATIC HYPERPLASIA WITH INCOMPLETE BLADDER EMPTYING: ICD-10-CM

## 2025-04-21 DIAGNOSIS — E66.01 MORBID OBESITY WITH BMI OF 40.0-44.9, ADULT (MULTI): ICD-10-CM

## 2025-04-21 LAB — POC HEMOGLOBIN A1C: 6.5 % (ref 4.2–6.5)

## 2025-04-21 PROCEDURE — 3048F LDL-C <100 MG/DL: CPT | Performed by: INTERNAL MEDICINE

## 2025-04-21 PROCEDURE — 1160F RVW MEDS BY RX/DR IN RCRD: CPT | Performed by: INTERNAL MEDICINE

## 2025-04-21 PROCEDURE — 99214 OFFICE O/P EST MOD 30 MIN: CPT | Performed by: INTERNAL MEDICINE

## 2025-04-21 PROCEDURE — 3044F HG A1C LEVEL LT 7.0%: CPT | Performed by: INTERNAL MEDICINE

## 2025-04-21 PROCEDURE — 3008F BODY MASS INDEX DOCD: CPT | Performed by: INTERNAL MEDICINE

## 2025-04-21 PROCEDURE — 83036 HEMOGLOBIN GLYCOSYLATED A1C: CPT | Performed by: INTERNAL MEDICINE

## 2025-04-21 PROCEDURE — 1036F TOBACCO NON-USER: CPT | Performed by: INTERNAL MEDICINE

## 2025-04-21 PROCEDURE — 1159F MED LIST DOCD IN RCRD: CPT | Performed by: INTERNAL MEDICINE

## 2025-04-21 PROCEDURE — 3075F SYST BP GE 130 - 139MM HG: CPT | Performed by: INTERNAL MEDICINE

## 2025-04-21 PROCEDURE — 3078F DIAST BP <80 MM HG: CPT | Performed by: INTERNAL MEDICINE

## 2025-04-21 RX ORDER — TADALAFIL 5 MG/1
5 TABLET ORAL DAILY
Qty: 90 TABLET | Refills: 3 | Status: SHIPPED | OUTPATIENT
Start: 2025-04-21 | End: 2026-04-21

## 2025-04-21 RX ORDER — FINASTERIDE 5 MG/1
5 TABLET, FILM COATED ORAL DAILY
Qty: 90 TABLET | Refills: 3 | Status: SHIPPED | OUTPATIENT
Start: 2025-04-21 | End: 2026-04-21

## 2025-04-21 RX ORDER — OLMESARTAN MEDOXOMIL AND HYDROCHLOROTHIAZIDE 40/25 40; 25 MG/1; MG/1
1 TABLET ORAL DAILY
Qty: 90 TABLET | Refills: 3 | Status: SHIPPED | OUTPATIENT
Start: 2025-04-21

## 2025-04-21 ASSESSMENT — ENCOUNTER SYMPTOMS
RESPIRATORY NEGATIVE: 1
CARDIOVASCULAR NEGATIVE: 1
CONSTITUTIONAL NEGATIVE: 1
GASTROINTESTINAL NEGATIVE: 1
MUSCULOSKELETAL NEGATIVE: 1

## 2025-04-21 NOTE — ASSESSMENT & PLAN NOTE
Discussed diet and exercise - will see if GLP1 helps with the weight loss  Encouraged to increasing walking

## 2025-04-21 NOTE — ASSESSMENT & PLAN NOTE
Will see urology this week  Orders:    tadalafil (Cialis) 5 mg tablet; Take 1 tablet (5 mg) by mouth once daily.

## 2025-04-21 NOTE — ASSESSMENT & PLAN NOTE
Stable  Orders:    olmesartan-hydrochlorothiazide (BENIcar HCT) 40-25 mg tablet; Take 1 tablet by mouth once daily.

## 2025-04-21 NOTE — ASSESSMENT & PLAN NOTE
On Metformin  Open to try a GLP 1  Referred to [facility]  Refused CGM today - will think about it    Orders:    POCT glycosylated hemoglobin (Hb A1C) manually resulted    Referral to Clinical Pharmacy; Future

## 2025-04-24 ENCOUNTER — APPOINTMENT (OUTPATIENT)
Dept: UROLOGY | Facility: CLINIC | Age: 65
End: 2025-04-24
Payer: COMMERCIAL

## 2025-04-30 ENCOUNTER — APPOINTMENT (OUTPATIENT)
Dept: PHARMACY | Facility: HOSPITAL | Age: 65
End: 2025-04-30
Payer: COMMERCIAL

## 2025-04-30 DIAGNOSIS — E11.9 TYPE 2 DIABETES MELLITUS WITHOUT COMPLICATION, WITHOUT LONG-TERM CURRENT USE OF INSULIN: Primary | ICD-10-CM

## 2025-04-30 PROCEDURE — RXMED WILLOW AMBULATORY MEDICATION CHARGE

## 2025-04-30 RX ORDER — TIRZEPATIDE 2.5 MG/.5ML
2.5 INJECTION, SOLUTION SUBCUTANEOUS WEEKLY
Qty: 2 ML | Refills: 0 | Status: SHIPPED | OUTPATIENT
Start: 2025-04-30

## 2025-04-30 NOTE — ASSESSMENT & PLAN NOTE
Patient's goal A1c is < 7%.  Is pt at goal? Yes, most recent A1c was 6.5% on 04/21/25 which had remained stable from 6.5% on 01/15/25.  Patient is not checking his blood sugar at home.     Rationale for plan:   Patient's A1c is well controlled and has been consistently for a little while  He is interested in starting GLP-1 therapy for weight loss benefits in addition to helping with glycemic control   Discussed Ozempic vs Mounjaro - patient prefers Mounjaro due to the easier dose administration with the autoinjector  Prescription submitted to [facility]     Medication Changes:  CONTINUE  Metformin 500 mg 1 tablet by mouth twice daily   START  Mounjaro 2.5 mg under the skin once weekly (will start after in-person pharmacy appointment next week)    Future Considerations:  Will go over administration again in-person next week, patient can either give his first dose in office or wait to give it at home when he would like to start     Monitoring and Education:  Patient educated on common side effects, boxed warnings, and steps of administration of once weekly Mounjaro as described above  He would like to meet in-person to walk through the administration again in-person     Patient will come in next Wednesday and we will go over the administration of Mounjaro again. He was encouraged to reach out with any questions and/or concerns prior to then.

## 2025-04-30 NOTE — PROGRESS NOTES
"  Clinical Pharmacy Appointment    Patient ID: Omar Gold \"Juan J\" is a 65 y.o. male who presents for Diabetes.    Pt is here for First appointment.     Referring Provider: Na Estevez MD  PCP: Na Estevez MD  Last visit with PCP: 04/21/2025   Next visit with PCP: 07/23/2025    Subjective     Drug Interactions  No relevant drug interactions were noted.    Medication System Management  Adherence/Organization: typically remembers to take his medications but may take them late; does use a pill box to help with organization   Affordability/Accessibility: no issues reported with current medications     Patient's preferred pharmacy:     [id number] [address]  [address] [facility] [address]  Phone: [phone] Fax: [phone]    [facility]acy #[id number] - [place], [place] - [address] [address] AT [facility] OF [address] [address]  Phone: [phone] Fax: [phone]    [facility] [place], [place] - [address]  [address]  [facility] [address]  Phone: [phone] Fax: [phone]    [facility] [address]  [address] [place] [id number]  Phone: [phone] Fax: [phone]    [facility]  [address] [place] [address], [address]  [place] [id number]  Phone: [phone] Fax: [phone]     At last office visit with Dr. Estevez, patient's A1c had remained stable at 6.5%, as it was previously 6.5% ~3 months ago in January. He was interested in trying a GLP-1 agonist and was referred to the pharmacy team to discuss this and options available.     HPI  DIABETES MELLITUS TYPE 2:    Diagnosed with diabetes: summer 2022 per patient.   Known diabetic complications: obesity.  Does patient follow with Endocrinology: No  Last optometry exam: has been a while, has an appointment scheduled next week  Most recent visit in Podiatry: does not follow with " podiatry -- patient denies sores or cuts on feet today      Current diabetic medications include:  Metformin 500 mg twice daily with meals     Historical diabetic medications include:   None     Adverse Effects: none reported today     Glucose Readings:  Patient does not check blood sugar at home     Any episodes of hypoglycemia? Yes, occasionally may have symptoms of blood sugar being too long.  Did patient treat episode of hypoglycemia appropriately? Yes, drank regular pop    Lifestyle:  Diet: 3-4 meals/day.   Breakfast: typically does not eat breakfast and does not have a snack in the morning  Lunch: a couple boiled eggs, nutrigrain bar, protein shake   In between lunch and dinner may have a snack - rice cakes, jerky type meat snack   Dinner: meat (grilled chicken, ground chicken or beef patties) and vegetables (broccoli, mushrooms, carrots), sometimes some rice, potatoes, or pasta   Typically does not eat anything after dinner  On the weekends diet is different - will typically go out to eat Friday night and Saturday sometimes; admits to overeating on the weekends and eating less healthy   Drinks: typically water, occasionally a protein shake; may use crystal light or electrolyte mixes with water (usually sugar free); has regular Coke occasionally (~2-3 cans per week or out at dinner)  Exercise:   Activity was limited over the winter  Has a lot of equipment at his house and has recently started using it again - weight rack and machine for weight lifting, treadmill   Wants to try to walk every day after work ~30 minutes, right now walking 3 days per week but wants to increase  Is limited by: notes pain in his hip after sitting for a while, does think that more stretching will help   Tobacco history: never smoked     Secondary Prevention:  Statin? Yes, Atorvastatin 20 mg daily   ACE-I/ARB? Yes, Olmesartan-hydrochlorothiazide 40-25 mg once daily   Aspirin? No    Pertinent PMH Review:  PMH of Pancreatitis: No  PMH  "of Retinopathy: No  PMH of Urinary Tract Infections: No  PMH of MTC: No  PMH of MEN2: No  UACR/EGFR in last year?: No UACR, Yes EGFR  No results found for: \"MICROALBCREA\"  EGFR 87 mL/min/1.73m2 on 01/15/25    Immunizations:  Influenza? 10/30/23  COVID? 05/22/21  Pneumonia? PCV20 01/16/25  Shingles? 01/16/25, due for 2nd dose   Tdap? 01/16/25      Objective   Allergies[1]  Social History     Social History Narrative    Not on file     Medication Reconciliation:  Discontinued:   Cholecalciferol (patient reports no longer taking)    Medication Review  Current Outpatient Medications   Medication Instructions    apixaban (ELIQUIS) 5 mg, oral, 2 times daily    ascorbic acid (Vitamin C) 500 mg tablet 2 tablets, Daily    atorvastatin (LIPITOR) 20 mg, oral, Daily    finasteride (PROSCAR) 5 mg, oral, Daily, Do not crush, chew, or split.    metFORMIN (GLUCOPHAGE) 500 mg, oral, 2 times daily (morning and late afternoon)    methocarbamol (ROBAXIN) 500 mg, oral, 4 times daily PRN    metoprolol succinate XL (TOPROL XL) 100 mg, oral, Daily, Do not crush or chew.    Mounjaro 2.5 mg, subcutaneous, Weekly    olmesartan-hydrochlorothiazide (BENIcar HCT) 40-25 mg tablet 1 tablet, oral, Daily    tadalafil (CIALIS) 5 mg, oral, Daily      Vitals  BP Readings from Last 2 Encounters:   04/21/25 137/75   01/16/25 161/77     BMI Readings from Last 1 Encounters:   04/21/25 42.86 kg/m²      Labs  A1C  Lab Results   Component Value Date    HGBA1C 6.5 04/21/2025    HGBA1C 6.5 (H) 01/15/2025    HGBA1C 5.1 08/05/2024     BMP  Lab Results   Component Value Date    CALCIUM 9.3 01/15/2025     01/15/2025    K 3.8 01/15/2025    CO2 30 01/15/2025     01/15/2025    BUN 26 (H) 01/15/2025    CREATININE 0.97 01/15/2025    EGFR 87 01/15/2025     LFTs  Lab Results   Component Value Date    ALT 18 01/15/2025    AST 17 01/15/2025    ALKPHOS 97 01/15/2025    BILITOT 0.6 01/15/2025     FLP  Lab Results   Component Value Date    TRIG 84 01/15/2025    " "CHOL 133 01/15/2025    LDLF 96 10/01/2022    LDLCALC 72 01/15/2025    HDL 44.5 01/15/2025     Urine Microalbumin  No results found for: \"MICROALBCREA\"    Weight Management  Wt Readings from Last 3 Encounters:   25 132 kg (290 lb 3.2 oz)   25 127 kg (280 lb)   25 130 kg (286 lb 12.8 oz)     Estimated body mass index is 42.86 kg/m² as calculated from the following:    Height as of 25: 1.753 m (5' 9\").    Weight as of 25: 132 kg (290 lb 3.2 oz).     Goal Weight: 200 lbs     Patient educated on common side effects and warnings of GLP-1 agonists:  Increased satiety is common  Stomach upset such as stomach cramping, nausea, constipation, etc which can be precipitated by eating fatty greasy foods and overeating  Discussed risks of GLP1ra including risk of pancreatitis, MTC and worsening of DR  Also discussed risks of gastroparesis and gastroparalysis as this is a common discussion point in the news - patient was informed that this is rare and they have no risk factors increasing their risk for developing these issues     Completed education for the administration of once-weekly Mounjaro:  Instructed patient that Mounjaro must be kept refrigerated, and if necessary a pen may be stored unrefrigerated at temperatures not to exceed 30C (86F) for up to 21 days.   Remove the Pen from the refrigerator. Leave the base cap on until you are ready to inject.  Check the Pen label to make sure you have the right medicine and it has not . Additionally, ensure that the solution is not cloudy, discolored, or has particles in it.  Prior to administration, wash and rinse hands.   Next, choose your injection site (You may inject into your abdomen or thigh; another person may give you the injection in your upper arm).  Change (rotate) your injection site each week. You may use the same area of your body, but be sure to choose a different injection site in that area.  Once administration site has been " selected, use a new alcohol swab to sanitize the administration site and allow to air dry.  First, make sure the pen is in the locked position. While still in the locked position remove the base cap (gray cap) and dispose into a regular trash. Do not attempt to put the base cap back on, this may damage the needle.  Place the Clear Base flat and firmly against your skin at the injection site.   Press and hold the purple injection button. You will hear a loud click. Continue holding the Clear Base firmly against your skin until you hear a second click. Do not rub the area after administration.  Dispose of the pen in a sharps container (i.e. Coffee can, laundry detergent bottle)  Injections should be done on the same day every week, if you forget you have up to 4 days (96 hours) to remember to do your next dose.       Assessment/Plan   Problem List Items Addressed This Visit       Type 2 diabetes mellitus without complication, without long-term current use of insulin - Primary    Patient's goal A1c is < 7%.  Is pt at goal? Yes, most recent A1c was 6.5% on 04/21/25 which had remained stable from 6.5% on 01/15/25.  Patient is not checking his blood sugar at home.     Rationale for plan:   Patient's A1c is well controlled and has been consistently for a little while  He is interested in starting GLP-1 therapy for weight loss benefits in addition to helping with glycemic control   Discussed Ozempic vs Mounjaro - patient prefers Mounjaro due to the easier dose administration with the autoinjector  Prescription submitted to [facility]     Medication Changes:  CONTINUE  Metformin 500 mg 1 tablet by mouth twice daily   START  Mounjaro 2.5 mg under the skin once weekly (will start after in-person pharmacy appointment next week)    Future Considerations:  Will go over administration again in-person next week, patient can either give his first dose in office or wait to give it at home when he would like to start      Monitoring and Education:  Patient educated on common side effects, boxed warnings, and steps of administration of once weekly Mounjaro as described above  He would like to meet in-person to walk through the administration again in-person     Patient will come in next Wednesday and we will go over the administration of Mounjaro again. He was encouraged to reach out with any questions and/or concerns prior to then.          Relevant Medications    tirzepatide (Mounjaro) 2.5 mg/0.5 mL pen injector    Other Relevant Orders    Referral to Clinical Pharmacy     Pharmacy Follow-Up: 05/07/2025   PCP Follow-Up: 07/23/2025    Continue all meds under the continuation of care with the referring provider and clinical pharmacy team.    Thank you,    Meir Macdonald, PharmD   Clinical Pharmacist    Verbal consent to manage patient's drug therapy was obtained from the patient. They were informed they may decline to participate or withdraw from participation in pharmacy services at any time.        [1] No Known Allergies

## 2025-05-07 ENCOUNTER — PHARMACY VISIT (OUTPATIENT)
Dept: PHARMACY | Facility: CLINIC | Age: 65
End: 2025-05-07
Payer: MEDICARE

## 2025-05-07 ENCOUNTER — CLINICAL SUPPORT (OUTPATIENT)
Dept: PRIMARY CARE | Facility: CLINIC | Age: 65
End: 2025-05-07
Payer: COMMERCIAL

## 2025-05-07 ENCOUNTER — APPOINTMENT (OUTPATIENT)
Dept: OPHTHALMOLOGY | Facility: CLINIC | Age: 65
End: 2025-05-07
Payer: COMMERCIAL

## 2025-05-07 DIAGNOSIS — H25.13 AGE-RELATED NUCLEAR CATARACT OF BOTH EYES: ICD-10-CM

## 2025-05-07 DIAGNOSIS — D23.122 PAPILLOMA OF LEFT LOWER EYELID: ICD-10-CM

## 2025-05-07 DIAGNOSIS — H52.4 ASTIGMATISM OF BOTH EYES WITH PRESBYOPIA: Primary | ICD-10-CM

## 2025-05-07 DIAGNOSIS — H52.203 ASTIGMATISM OF BOTH EYES WITH PRESBYOPIA: Primary | ICD-10-CM

## 2025-05-07 DIAGNOSIS — E11.9 TYPE 2 DIABETES MELLITUS WITHOUT COMPLICATION, WITHOUT LONG-TERM CURRENT USE OF INSULIN: ICD-10-CM

## 2025-05-07 DIAGNOSIS — E11.9 TYPE 2 DIABETES MELLITUS WITHOUT COMPLICATION, WITHOUT LONG-TERM CURRENT USE OF INSULIN: Primary | ICD-10-CM

## 2025-05-07 DIAGNOSIS — H52.11 MYOPIA OF RIGHT EYE: ICD-10-CM

## 2025-05-07 PROCEDURE — 92015 DETERMINE REFRACTIVE STATE: CPT | Performed by: OPTOMETRIST

## 2025-05-07 PROCEDURE — 92004 COMPRE OPH EXAM NEW PT 1/>: CPT | Performed by: OPTOMETRIST

## 2025-05-07 ASSESSMENT — REFRACTION_MANIFEST
METHOD_AUTOREFRACTION: 1
OS_SPHERE: PLANO
OS_CYLINDER: -1.00
OS_SPHERE: PLANO
OS_AXIS: 069
OD_CYLINDER: -1.75
OS_AXIS: 060
OD_CYLINDER: -1.00
OD_SPHERE: -0.50
OD_CYLINDER: -1.75
OS_SPHERE: -1.00
OD_ADD: +2.25
OD_SPHERE: -0.50
OS_CYLINDER: -0.75
OD_AXIS: 095
OD_SPHERE: -0.50
OS_CYLINDER: -1.00
OS_ADD: +2.25
OS_AXIS: 069

## 2025-05-07 ASSESSMENT — CONF VISUAL FIELD
OD_INFERIOR_TEMPORAL_RESTRICTION: 0
OD_SUPERIOR_NASAL_RESTRICTION: 0
OS_INFERIOR_NASAL_RESTRICTION: 0
OS_SUPERIOR_NASAL_RESTRICTION: 0
OD_NORMAL: 1
OD_SUPERIOR_TEMPORAL_RESTRICTION: 0
OS_NORMAL: 1
OS_SUPERIOR_TEMPORAL_RESTRICTION: 0
OD_INFERIOR_NASAL_RESTRICTION: 0
OS_INFERIOR_TEMPORAL_RESTRICTION: 0

## 2025-05-07 ASSESSMENT — ENCOUNTER SYMPTOMS
GASTROINTESTINAL NEGATIVE: 0
EYES NEGATIVE: 0
MUSCULOSKELETAL NEGATIVE: 0
NEUROLOGICAL NEGATIVE: 0
CONSTITUTIONAL NEGATIVE: 0
ALLERGIC/IMMUNOLOGIC NEGATIVE: 0
HEMATOLOGIC/LYMPHATIC NEGATIVE: 0
CARDIOVASCULAR NEGATIVE: 0
PSYCHIATRIC NEGATIVE: 0
RESPIRATORY NEGATIVE: 0
ENDOCRINE NEGATIVE: 0

## 2025-05-07 ASSESSMENT — CUP TO DISC RATIO
OD_RATIO: 0.5 TILTED
OS_RATIO: 0.55

## 2025-05-07 ASSESSMENT — TONOMETRY
OD_IOP_MMHG: 17
IOP_METHOD: GOLDMANN APPLANATION
OS_IOP_MMHG: 18

## 2025-05-07 ASSESSMENT — VISUAL ACUITY
OD_SC+: +2
METHOD: SNELLEN - LINEAR
OS_SC+: +2
OD_SC: 20/30
OS_SC: 20/25

## 2025-05-07 ASSESSMENT — SLIT LAMP EXAM - LIDS
COMMENTS: NORMAL
COMMENTS: NORMAL

## 2025-05-07 ASSESSMENT — EXTERNAL EXAM - LEFT EYE: OS_EXAM: PAPILLOMA UPPER

## 2025-05-07 ASSESSMENT — EXTERNAL EXAM - RIGHT EYE: OD_EXAM: NORMAL

## 2025-05-07 NOTE — ASSESSMENT & PLAN NOTE
Mild Rx. Pt currently not wearing any correction. Hx of LASIK OU in 2004.  Pt educated on findings. Release optional Rx for full time wear. Discussed adaptation and benefits of Spec correction. Monitor annually. Pt voiced understanding.

## 2025-05-07 NOTE — ASSESSMENT & PLAN NOTE
Papilloma left upper brow. Pt would like taken out.  Pt educated on findings. Will schedule with Dr. Kilpatrick for next available for evaluation and removal. Pt voiced understanding.

## 2025-05-07 NOTE — PROGRESS NOTES
"  Clinical Pharmacy Appointment    Patient ID: Omar Gold \"Landry" is a 65 y.o. male who presents for No chief complaint on file..    Pt is here for Follow Up appointment.     Referring Provider: Na Estevez MD  PCP: Na Estevez MD  Last visit with PCP: 04/21/2025   Next visit with PCP: 07/23/2025    Subjective     Drug Interactions  No relevant drug interactions were noted.    Medication System Management  Adherence/Organization: typically remembers to take his medications but may take them late; does use a pill box to help with organization   Affordability/Accessibility: no issues reported with current medications     Patient's preferred pharmacy:     [id number] [address]  [address] [id number]  Phone: [phone] Fax: [phone]    [facility]acy [id number] [place], [place] - [address] [address] [place] [address]  Phone: [phone] Fax: [phone]    [facility] [place], [place] - [address]  [address] [facility] [address]  Phone: [phone] Fax: [phone]    [facility] [address] [place], [place] - [address]  [address] [facility] [address]  Phone: [phone] Fax: [phone]    [facility]  [address] [place] [address], [address]  [place] [id number]  Phone: [phone] Fax: [phone]     At last office visit with Dr. Estevez, patient's A1c had remained stable at 6.5%, as it was previously 6.5% ~3 months ago in January. He was interested in trying a GLP-1 agonist and was referred to the pharmacy team to discuss this and options available.     At last pharmacy encounter patient was prescribed Mounjaro 2.5 mg once weekly. We are meeting today for in-person demonstration of administration prior to patient starting.     HPI  DIABETES MELLITUS TYPE 2:    Diagnosed with diabetes: summer 2022 per patient.   Known diabetic " complications: obesity.  Does patient follow with Endocrinology: No  Last optometry exam: 05/07/25  Most recent visit in Podiatry: does not follow with podiatry -- patient denies sores or cuts on feet today      Current diabetic medications include:  Metformin 500 mg twice daily with meals     Historical diabetic medications include:   None     Adverse Effects: none reported today     Glucose Readings:  Patient does not check blood sugar at home     Any episodes of hypoglycemia? Yes, occasionally may have symptoms of blood sugar being too long.  Did patient treat episode of hypoglycemia appropriately? Yes, drank regular pop    Lifestyle:  Diet: 3-4 meals/day.   Breakfast: typically does not eat breakfast and does not have a snack in the morning  Lunch: a couple boiled eggs, nutrigrain bar, protein shake   In between lunch and dinner may have a snack - rice cakes, jerky type meat snack   Dinner: meat (grilled chicken, ground chicken or beef patties) and vegetables (broccoli, mushrooms, carrots), sometimes some rice, potatoes, or pasta   Typically does not eat anything after dinner  On the weekends diet is different - will typically go out to eat Friday night and Saturday sometimes; admits to overeating on the weekends and eating less healthy   Drinks: typically water, occasionally a protein shake; may use crystal light or electrolyte mixes with water (usually sugar free); has regular Coke occasionally (~2-3 cans per week or out at dinner)  Exercise:   Activity was limited over the winter  Has a lot of equipment at his house and has recently started using it again - weight rack and machine for weight lifting, treadmill   Wants to try to walk every day after work ~30 minutes, right now walking 3 days per week but wants to increase  Is limited by: notes pain in his hip after sitting for a while, does think that more stretching will help   Tobacco history: never smoked     Secondary Prevention:  Statin? Yes, Atorvastatin  "20 mg daily   ACE-I/ARB? Yes, Olmesartan-hydrochlorothiazide 40-25 mg once daily   Aspirin? No    Pertinent PMH Review:  PMH of Pancreatitis: No  PMH of Retinopathy: No  PMH of Urinary Tract Infections: No  PMH of MTC: No  PMH of MEN2: No  UACR/EGFR in last year?: No UACR, Yes EGFR  No results found for: \"MICROALBCREA\"  EGFR 87 mL/min/1.73m2 on 01/15/25    Immunizations:  Influenza? 10/30/23  COVID? 05/22/21  Pneumonia? PCV20 01/16/25  Shingles? 01/16/25, due for 2nd dose   Tdap? 01/16/25      Objective   Allergies[1]  Social History     Social History Narrative    Not on file     Medication Reconciliation:  Discontinued:   Cholecalciferol (patient reports no longer taking)    Medication Review  Current Outpatient Medications   Medication Instructions    apixaban (ELIQUIS) 5 mg, oral, 2 times daily    ascorbic acid (Vitamin C) 500 mg tablet 2 tablets, Daily    atorvastatin (LIPITOR) 20 mg, oral, Daily    finasteride (PROSCAR) 5 mg, oral, Daily, Do not crush, chew, or split.    metFORMIN (GLUCOPHAGE) 500 mg, oral, 2 times daily (morning and late afternoon)    methocarbamol (ROBAXIN) 500 mg, oral, 4 times daily PRN    metoprolol succinate XL (TOPROL XL) 100 mg, oral, Daily, Do not crush or chew.    Mounjaro 2.5 mg, subcutaneous, Weekly    olmesartan-hydrochlorothiazide (BENIcar HCT) 40-25 mg tablet 1 tablet, oral, Daily    tadalafil (CIALIS) 5 mg, oral, Daily      Vitals  BP Readings from Last 2 Encounters:   04/21/25 137/75   01/16/25 161/77     BMI Readings from Last 1 Encounters:   04/21/25 42.86 kg/m²      Labs  A1C  Lab Results   Component Value Date    HGBA1C 6.5 04/21/2025    HGBA1C 6.5 (H) 01/15/2025    HGBA1C 5.1 08/05/2024     BMP  Lab Results   Component Value Date    CALCIUM 9.3 01/15/2025     01/15/2025    K 3.8 01/15/2025    CO2 30 01/15/2025     01/15/2025    BUN 26 (H) 01/15/2025    CREATININE 0.97 01/15/2025    EGFR 87 01/15/2025     LFTs  Lab Results   Component Value Date    ALT 18 " "01/15/2025    AST 17 01/15/2025    ALKPHOS 97 01/15/2025    BILITOT 0.6 01/15/2025     FLP  Lab Results   Component Value Date    TRIG 84 01/15/2025    CHOL 133 01/15/2025    LDLF 96 10/01/2022    LDLCALC 72 01/15/2025    HDL 44.5 01/15/2025     Urine Microalbumin  No results found for: \"MICROALBCREA\"    Weight Management  Wt Readings from Last 3 Encounters:   25 132 kg (290 lb 3.2 oz)   25 127 kg (280 lb)   25 130 kg (286 lb 12.8 oz)     Estimated body mass index is 42.86 kg/m² as calculated from the following:    Height as of 25: 1.753 m (5' 9\").    Weight as of 25: 132 kg (290 lb 3.2 oz).     Goal Weight: 200 lbs     Patient educated on common side effects and warnings of GLP-1 agonists:  Increased satiety is common  Stomach upset such as stomach cramping, nausea, constipation, etc which can be precipitated by eating fatty greasy foods and overeating  Discussed risks of GLP1ra including risk of pancreatitis, MTC and worsening of DR  Also discussed risks of gastroparesis and gastroparalysis as this is a common discussion point in the news - patient was informed that this is rare and they have no risk factors increasing their risk for developing these issues     Completed education for the administration of once-weekly Mounjaro:  Instructed patient that Mounjaro must be kept refrigerated, and if necessary a pen may be stored unrefrigerated at temperatures not to exceed 30C (86F) for up to 21 days.   Remove the Pen from the refrigerator. Leave the base cap on until you are ready to inject.  Check the Pen label to make sure you have the right medicine and it has not . Additionally, ensure that the solution is not cloudy, discolored, or has particles in it.  Prior to administration, wash and rinse hands.   Next, choose your injection site (You may inject into your abdomen or thigh; another person may give you the injection in your upper arm).  Change (rotate) your injection site each " week. You may use the same area of your body, but be sure to choose a different injection site in that area.  Once administration site has been selected, use a new alcohol swab to sanitize the administration site and allow to air dry.  First, make sure the pen is in the locked position. While still in the locked position remove the base cap (gray cap) and dispose into a regular trash. Do not attempt to put the base cap back on, this may damage the needle.  Place the Clear Base flat and firmly against your skin at the injection site.   Press and hold the purple injection button. You will hear a loud click. Continue holding the Clear Base firmly against your skin until you hear a second click. Do not rub the area after administration.  Dispose of the pen in a sharps container (i.e. Coffee can, laundry detergent bottle)  Injections should be done on the same day every week, if you forget you have up to 4 days (96 hours) to remember to do your next dose.       Assessment/Plan   Problem List Items Addressed This Visit       Type 2 diabetes mellitus without complication, without long-term current use of insulin - Primary    Patient's goal A1c is < 7%.  Is pt at goal? Yes, most recent A1c was 6.5% on 04/21/25 which had remained stable from 6.5% on 01/15/25.  Patient is not checking his blood sugar at home.     Rationale for plan:   Patient's A1c is well controlled and has been consistently for a little while  He is interested in starting GLP-1 therapy for weight loss benefits in addition to helping with glycemic control   Patient gave first dose of Mounjaro in office today after in-person demonstration     Medication Changes:  CONTINUE  Metformin 500 mg 1 tablet by mouth twice daily   START  Mounjaro 2.5 mg under the skin once weekly (first dose given by patient in-office today)    Future Considerations:  If patient tolerates the first few weeks of Mounjaro well will likely plan to titrate up to 5 mg once weekly      Monitoring and Education:  Patient educated on common side effects, boxed warnings, and steps of administration of once weekly Mounjaro as described above using demo pen  Patient walked through steps and gave his first dose in-person   Informed patient that weight loss benefits will be maximized when paired with diet and exercise changes. Discussed at length ensuring eating healthy and focusing on protein, vegetables, and whole grain carbohydrates as well as working on increasing activity while on the medication.   Also explained dose titration process to patient - that we can titrate up at the earliest every 4 weeks. If he is doing really well with weight loss on a dose may continue at that dose until weight loss starts to plateau.   We will assess for any side effects at next follow-up to ensure he is tolerating the medication well   Will obtain updated home weights to assess efficacy of therapy    We will follow-up in 3 weeks to see how patient has done with his first few weeks of Mounjaro therapy. He was encouraged to reach out with any questions and/or concerns prior to then.         Relevant Orders    Referral to Clinical Pharmacy     Pharmacy Follow-Up: 05/28/2025   PCP Follow-Up: 07/23/2025    Continue all meds under the continuation of care with the referring provider and clinical pharmacy team.    Thank you,    Meir Macdonald, PharmD   Clinical Pharmacist    Verbal consent to manage patient's drug therapy was obtained from the patient. They were informed they may decline to participate or withdraw from participation in pharmacy services at any time.          [1] No Known Allergies

## 2025-05-07 NOTE — PROGRESS NOTES
Assessment/Plan   Problem List Items Addressed This Visit          Eye/Vision problems    Type 2 diabetes mellitus without complication, without long-term current use of insulin    No diabetic retinopathy OU. No macular edema OU.  Pt educated on findings and importance of maintaining a low A1C and FBS to prevent vision loss from diabetic retinopathy. Continue care with PCP as directed. Monitor annually with dilated fundus examination. Pt voiced understanding.         Myopia of right eye    See astigmatism/presbyopia.         Astigmatism of both eyes with presbyopia - Primary    Mild Rx. Pt currently not wearing any correction. Hx of LASIK OU in 2004.  Pt educated on findings. Release optional Rx for full time wear. Discussed adaptation and benefits of Spec correction. Monitor annually. Pt voiced understanding.            Papilloma of left lower eyelid    Papilloma left upper brow. Pt would like taken out.  Pt educated on findings. Will schedule with Dr. Kilpatrick for next available for evaluation and removal. Pt voiced understanding.          Age-related nuclear cataract of both eyes    1+ NS OU.  Not visually significant.  Pt educated on findings and importance of UV protection when outdoors. Recommend anti-glare coating in lenses to reduce symptoms of night glare. Continue to monitor annually at this time. Pt voiced understanding.

## 2025-05-08 NOTE — ASSESSMENT & PLAN NOTE
Patient's goal A1c is < 7%.  Is pt at goal? Yes, most recent A1c was 6.5% on 04/21/25 which had remained stable from 6.5% on 01/15/25.  Patient is not checking his blood sugar at home.     Rationale for plan:   Patient's A1c is well controlled and has been consistently for a little while  He is interested in starting GLP-1 therapy for weight loss benefits in addition to helping with glycemic control   Patient gave first dose of Mounjaro in office today after in-person demonstration     Medication Changes:  CONTINUE  Metformin 500 mg 1 tablet by mouth twice daily   START  Mounjaro 2.5 mg under the skin once weekly (first dose given by patient in-office today)    Future Considerations:  If patient tolerates the first few weeks of Mounjaro well will likely plan to titrate up to 5 mg once weekly     Monitoring and Education:  Patient educated on common side effects, boxed warnings, and steps of administration of once weekly Mounjaro as described above using demo pen  Patient walked through steps and gave his first dose in-person   Informed patient that weight loss benefits will be maximized when paired with diet and exercise changes. Discussed at length ensuring eating healthy and focusing on protein, vegetables, and whole grain carbohydrates as well as working on increasing activity while on the medication.   Also explained dose titration process to patient - that we can titrate up at the earliest every 4 weeks. If he is doing really well with weight loss on a dose may continue at that dose until weight loss starts to plateau.   We will assess for any side effects at next follow-up to ensure he is tolerating the medication well   Will obtain updated home weights to assess efficacy of therapy    We will follow-up in 3 weeks to see how patient has done with his first few weeks of Mounjaro therapy. He was encouraged to reach out with any questions and/or concerns prior to then.

## 2025-05-28 ENCOUNTER — APPOINTMENT (OUTPATIENT)
Dept: PHARMACY | Facility: HOSPITAL | Age: 65
End: 2025-05-28
Payer: COMMERCIAL

## 2025-05-28 DIAGNOSIS — E11.9 TYPE 2 DIABETES MELLITUS WITHOUT COMPLICATION, WITHOUT LONG-TERM CURRENT USE OF INSULIN: Primary | ICD-10-CM

## 2025-05-28 RX ORDER — TIRZEPATIDE 2.5 MG/.5ML
2.5 INJECTION, SOLUTION SUBCUTANEOUS WEEKLY
Qty: 2 ML | Refills: 0 | Status: SHIPPED | OUTPATIENT
Start: 2025-05-28

## 2025-05-28 NOTE — ASSESSMENT & PLAN NOTE
Patient's goal A1c is < 7%.  Is pt at goal? Yes, most recent A1c was 6.5% on 04/21/25 which had remained stable from 6.5% on 01/15/25.  Patient is not checking his blood sugar at home.     Rationale for plan:   Patient's A1c is well controlled and has been consistently for a little while  He has tolerated the first few weeks of Mounjaro well, has had some mild constipation but it has been manageable and he has not needed to use OTC laxatives.   Starting weight: 290.2 lbs on 04/21/25  Current weight: 277.2 lbs  Has lost 13 lbs over the past 3-4 weeks (~4.5% of total body weight)  Due to patient losing significant weight and denying loss of satiety will continue at 2.5 mg for another month.     Medication Changes:  CONTINUE  Metformin 500 mg 1 tablet by mouth twice daily  Mounjaro 2.5 mg under the skin once weekly     Future Considerations:  If patient continues to tolerate the 2.5 mg dose of Mounjaro well, once weight loss slows can titrate up to 5 mg once weekly     Monitoring and Education:  We will assess for any side effects at next follow-up to ensure he is continuing to tolerate the medication well and to ensure constipation is not worsening   Will obtain updated home weights to assess efficacy of therapy  Provided Mounjaro support number to patient to help him find his copay card that he signed up for in-office with me last time    We will follow-up in 4 weeks to see how patient has done continuing on his current dose of Mounjaro. He was encouraged to reach out with any questions and/or concerns prior to then.

## 2025-05-28 NOTE — PROGRESS NOTES
"  Clinical Pharmacy Appointment    Patient ID: Omar Gold \"Juan J\" is a 65 y.o. male who presents for Diabetes.    Pt is here for Follow Up appointment.     Referring Provider: Na Estevez MD  PCP: Na Estevez MD  Last visit with PCP: 04/21/2025   Next visit with PCP: 07/23/2025    Subjective     Drug Interactions  No relevant drug interactions were noted.    Medication System Management  Adherence/Organization: typically remembers to take his medications but may take them late; does use a pill box to help with organization   Affordability/Accessibility: no issues reported with current medications     Patient's preferred pharmacy:     "[facility] #[id number] [address] [facility]  [address] [facility] [address]  Phone: [phone] Fax: [phone]    [facility]/pharmacy #[id number] - [place], [place] - [address] [facility] AT [facility] OF [address] [address]  Phone: [phone] Fax: [phone]    [facility] [place], [place] - [address] [facility]  [address] [facility] [address] [facility] [address]  Phone: [phone] Fax: [phone]    [facility] [address] [facility] [address]  [address] [facility] [address]  Phone: [phone] Fax: [phone]    [facility]  [address] [place] [address], [address]  [place] [id number]  Phone: [phone] Fax: [phone]     At last office visit with Dr. Estevez, patient's A1c had remained stable at 6.5%, as it was previously 6.5% ~3 months ago in January. He was interested in trying a GLP-1 agonist and was referred to the pharmacy team to discuss this and options available.     On 05/07/25 patient was educated on once weekly Mounjaro in-office and administered his first dose in-office that day. We are following-up today to see how he has done with the first few weeks of therapy.     HPI  DIABETES MELLITUS TYPE 2:    Diagnosed with diabetes: summer 2022 per patient.   Known " diabetic complications: obesity.  Does patient follow with Endocrinology: No  Last optometry exam: has been a while, has an appointment scheduled next week  Most recent visit in Podiatry: does not follow with podiatry -- patient denies sores or cuts on feet today      Current diabetic medications include:  Metformin 500 mg twice daily with meals   Mounjaro 2.5 mg once weekly (Wednesdays)  Has 1 pen left at home for tonight    Historical diabetic medications include:   None     Adverse Effects:   Notes some mild constipation but has been manageable, has not had to use any laxatives over the counter      Glucose Readings:  Patient does not check blood sugar at home     Any episodes of hypoglycemia? Yes, occasionally may have symptoms of blood sugar being too long.  Did patient treat episode of hypoglycemia appropriately? Yes, drank regular pop    Lifestyle:  Diet: 3-4 meals/day.   Today notes eating smaller portion sizes and generally making healthier eating choices   Breakfast: typically does not eat breakfast and does not have a snack in the morning  Lunch: a couple boiled eggs, nutrigrain bar, protein shake   In between lunch and dinner may have a snack - rice cakes, jerky type meat snack   Dinner: meat (grilled chicken, ground chicken or beef patties) and vegetables (broccoli, mushrooms, carrots), sometimes some rice, potatoes, or pasta   Typically does not eat anything after dinner  On the weekends diet is different - will typically go out to eat Friday night and Saturday sometimes; admits to overeating on the weekends and eating less healthy   Drinks: typically water, occasionally a protein shake; may use crystal light or electrolyte mixes with water (usually sugar free); has regular Coke occasionally (~2-3 cans per week or out at dinner)  Exercise:   No changes reported from last encounter  Has a lot of equipment at his house and has recently started using it again - weight rack and machine for weight lifting,  "treadmill   Wants to try to walk every day after work ~30 minutes, right now walking 3 days per week but wants to increase  Is limited by: notes pain in his hip after sitting for a while, does think that more stretching will help   Tobacco history: never smoked     Secondary Prevention:  Statin? Yes, Atorvastatin 20 mg daily   ACE-I/ARB? Yes, Olmesartan-hydrochlorothiazide 40-25 mg once daily   Aspirin? No    Pertinent PMH Review:  PMH of Pancreatitis: No  PMH of Retinopathy: No  PMH of Urinary Tract Infections: No  PMH of MTC: No  PMH of MEN2: No  UACR/EGFR in last year?: No UACR, Yes EGFR  No results found for: \"MICROALBCREA\"  EGFR 87 mL/min/1.73m2 on 01/15/25    Immunizations:  Influenza? 10/30/23  COVID? 05/22/21  Pneumonia? PCV20 01/16/25  Shingles? 01/16/25, due for 2nd dose   Tdap? 01/16/25      Objective   Allergies[1]  Social History     Social History Narrative    Not on file     Medication Reconciliation:  Discontinued:   Cholecalciferol (patient reports no longer taking)    Medication Review  Current Outpatient Medications   Medication Instructions    apixaban (ELIQUIS) 5 mg, oral, 2 times daily    ascorbic acid (Vitamin C) 500 mg tablet 2 tablets, Daily    atorvastatin (LIPITOR) 20 mg, oral, Daily    finasteride (PROSCAR) 5 mg, oral, Daily, Do not crush, chew, or split.    metFORMIN (GLUCOPHAGE) 500 mg, oral, 2 times daily (morning and late afternoon)    methocarbamol (ROBAXIN) 500 mg, oral, 4 times daily PRN    metoprolol succinate XL (TOPROL XL) 100 mg, oral, Daily, Do not crush or chew.    Mounjaro 2.5 mg, subcutaneous, Weekly    olmesartan-hydrochlorothiazide (BENIcar HCT) 40-25 mg tablet 1 tablet, oral, Daily    tadalafil (CIALIS) 5 mg, oral, Daily      Vitals  BP Readings from Last 2 Encounters:   04/21/25 137/75   01/16/25 161/77     BMI Readings from Last 1 Encounters:   04/21/25 42.86 kg/m²      Labs  A1C  Lab Results   Component Value Date    HGBA1C 6.5 04/21/2025    HGBA1C 6.5 (H) 01/15/2025 " "   HGBA1C 5.1 08/05/2024     BMP  Lab Results   Component Value Date    CALCIUM 9.3 01/15/2025     01/15/2025    K 3.8 01/15/2025    CO2 30 01/15/2025     01/15/2025    BUN 26 (H) 01/15/2025    CREATININE 0.97 01/15/2025    EGFR 87 01/15/2025     LFTs  Lab Results   Component Value Date    ALT 18 01/15/2025    AST 17 01/15/2025    ALKPHOS 97 01/15/2025    BILITOT 0.6 01/15/2025     FLP  Lab Results   Component Value Date    TRIG 84 01/15/2025    CHOL 133 01/15/2025    LDLF 96 10/01/2022    LDLCALC 72 01/15/2025    HDL 44.5 01/15/2025     Urine Microalbumin  No results found for: \"MICROALBCREA\"    Weight Management  Wt Readings from Last 3 Encounters:   04/21/25 132 kg (290 lb 3.2 oz)   03/27/25 127 kg (280 lb)   01/16/25 130 kg (286 lb 12.8 oz)     Estimated body mass index is 42.86 kg/m² as calculated from the following:    Height as of 4/21/25: 1.753 m (5' 9\").    Weight as of 4/21/25: 132 kg (290 lb 3.2 oz).     Goal Weight: 200 lbs     Patient Reported Home Weights:   05/26/25: 277.2 lbs       Assessment/Plan   Problem List Items Addressed This Visit       Type 2 diabetes mellitus without complication, without long-term current use of insulin - Primary    Patient's goal A1c is < 7%.  Is pt at goal? Yes, most recent A1c was 6.5% on 04/21/25 which had remained stable from 6.5% on 01/15/25.  Patient is not checking his blood sugar at home.     Rationale for plan:   Patient's A1c is well controlled and has been consistently for a little while  He has tolerated the first few weeks of Mounjaro well, has had some mild constipation but it has been manageable and he has not needed to use OTC laxatives.   Starting weight: 290.2 lbs on 04/21/25  Current weight: 277.2 lbs  Has lost 13 lbs over the past 3-4 weeks (~4.5% of total body weight)  Due to patient losing significant weight and denying loss of satiety will continue at 2.5 mg for another month.     Medication Changes:  CONTINUE  Metformin 500 mg 1 tablet " by mouth twice daily  Mounjaro 2.5 mg under the skin once weekly     Future Considerations:  If patient continues to tolerate the 2.5 mg dose of Mounjaro well, once weight loss slows can titrate up to 5 mg once weekly     Monitoring and Education:  We will assess for any side effects at next follow-up to ensure he is continuing to tolerate the medication well and to ensure constipation is not worsening   Will obtain updated home weights to assess efficacy of therapy  Provided Mounjaro support number to patient to help him find his copay card that he signed up for in-office with me last time    We will follow-up in 4 weeks to see how patient has done continuing on his current dose of Mounjaro. He was encouraged to reach out with any questions and/or concerns prior to then.         Relevant Medications    tirzepatide (Mounjaro) 2.5 mg/0.5 mL pen injector    Other Relevant Orders    Referral to Clinical Pharmacy     Pharmacy Follow-Up: 06/25/2025  PCP Follow-Up: 07/23/2025    Continue all meds under the continuation of care with the referring provider and clinical pharmacy team.    Thank you,    Meir Macdonald, PharmD   Clinical Pharmacist    Verbal consent to manage patient's drug therapy was obtained from the patient. They were informed they may decline to participate or withdraw from participation in pharmacy services at any time.          [1] No Known Allergies

## 2025-06-25 ENCOUNTER — APPOINTMENT (OUTPATIENT)
Dept: PHARMACY | Facility: HOSPITAL | Age: 65
End: 2025-06-25
Payer: COMMERCIAL

## 2025-06-25 DIAGNOSIS — E11.9 TYPE 2 DIABETES MELLITUS WITHOUT COMPLICATION, WITHOUT LONG-TERM CURRENT USE OF INSULIN: Primary | ICD-10-CM

## 2025-06-25 RX ORDER — TIRZEPATIDE 5 MG/.5ML
5 INJECTION, SOLUTION SUBCUTANEOUS WEEKLY
Qty: 2 ML | Refills: 0 | Status: SHIPPED | OUTPATIENT
Start: 2025-06-25

## 2025-06-25 NOTE — PROGRESS NOTES
"  Clinical Pharmacy Appointment    Patient ID: Omar Gold \"Juan J\" is a 65 y.o. male who presents for Weight Management.    Pt is here for Follow Up appointment.     Referring Provider: Na Estevez MD  PCP: Na Estevez MD  Last visit with PCP: 04/21/2025   Next visit with PCP: 07/23/2025    Subjective     Drug Interactions  No relevant drug interactions were noted.    Medication System Management  Adherence/Organization: typically remembers to take his medications but may take them late; does use a pill box to help with organization   Affordability/Accessibility: no issues reported with current medications     Patient's preferred pharmacy:     [facility][id number] [facility] [address] [facility]  [address] [facility] [address]  Phone: [phone] Fax: [phone]    [facility]/pharmacy #[id number] - [place], [place] - [address] [facility] AT [address] [address]  Phone: [phone] Fax: [phone]    [facility] [place], [place] - [address]  [address]  [facility] [address]  Phone: [phone] Fax: [phone]    [facility] [address] [place], [place] - [address]  [address] [facility] [address]  Phone: [phone] Fax: [phone]    [facility]  [address] [place] [address], [address]  [place] [id number]  Phone: [phone] Fax: [phone]     At last office visit with Dr. Estevez, patient's A1c had remained stable at 6.5%, as it was previously 6.5% ~3 months ago in January. He was interested in trying a GLP-1 agonist and was referred to the pharmacy team to discuss this and options available.     At last pharmacy encounter he was doing very well with weight loss at the 2.5 mg dose of Mounjaro so he was continued on it for another month. We are following-up today to see how he has done over the past month.     HPI  DIABETES MELLITUS TYPE 2:    Diagnosed with diabetes: summer 2022 per " patient.   Known diabetic complications: obesity.  Does patient follow with Endocrinology: No  Last optometry exam: has been a while, has an appointment scheduled next week  Most recent visit in Podiatry: does not follow with podiatry -- patient denies sores or cuts on feet today      Current diabetic medications include:  Metformin 500 mg twice daily with meals   Mounjaro 2.5 mg once weekly (Wednesdays)  Has 1 pen left at home for dose tonight    Historical diabetic medications include:   None     Adverse Effects:    Notes some increased heartburn and mild constipation - does note that both have been manageable; has not needed any OTC laxatives at all   Satiety has been waning towards the end of the week     Glucose Readings:  Patient does not check blood sugar at home     Any episodes of hypoglycemia? Yes, occasionally may have symptoms of blood sugar being too long.  Did patient treat episode of hypoglycemia appropriately? Yes, drank regular pop    Lifestyle:  No changes reported today   Diet: 3-4 meals/day.   Today notes eating smaller portion sizes and generally making healthier eating choices   Breakfast: typically does not eat breakfast and does not have a snack in the morning  Lunch: a couple boiled eggs, nutrigrain bar, protein shake   In between lunch and dinner may have a snack - rice cakes, jerky type meat snack   Dinner: meat (grilled chicken, ground chicken or beef patties) and vegetables (broccoli, mushrooms, carrots), sometimes some rice, potatoes, or pasta   Typically does not eat anything after dinner  On the weekends diet is different - will typically go out to eat Friday night and Saturday sometimes; admits to overeating on the weekends and eating less healthy   Drinks: typically water, occasionally a protein shake; may use crystal light or electrolyte mixes with water (usually sugar free); has regular Coke occasionally (~2-3 cans per week or out at dinner)  Exercise:   Has a lot of equipment at  "his house and has recently started using it again - weight rack and machine for weight lifting, treadmill   Wants to try to walk every day after work ~30 minutes, right now walking 3 days per week but wants to increase - notes walking has been limited recently due to more work   Is limited by: notes pain in his hip after sitting for a while, does think that more stretching will help   Tobacco history: never smoked     Secondary Prevention:  Statin? Yes, Atorvastatin 20 mg daily   ACE-I/ARB? Yes, Olmesartan-hydrochlorothiazide 40-25 mg once daily   Aspirin? No    Pertinent PMH Review:  PMH of Pancreatitis: No  PMH of Retinopathy: No  PMH of Urinary Tract Infections: No  PMH of MTC: No  PMH of MEN2: No  UACR/EGFR in last year?: No UACR, Yes EGFR  No results found for: \"MICROALBCREA\"  EGFR 87 mL/min/1.73m2 on 01/15/25    Immunizations:  Influenza? 10/30/23  COVID? 05/22/21  Pneumonia? PCV20 01/16/25  Shingles? 01/16/25, due for 2nd dose   Tdap? 01/16/25      Objective   Allergies[1]  Social History     Social History Narrative    Not on file     Medication Reconciliation:  No changes reported by patient today      Medication Review  Current Outpatient Medications   Medication Instructions    apixaban (ELIQUIS) 5 mg, oral, 2 times daily    ascorbic acid (Vitamin C) 500 mg tablet 2 tablets, Daily    atorvastatin (LIPITOR) 20 mg, oral, Daily    finasteride (PROSCAR) 5 mg, oral, Daily, Do not crush, chew, or split.    metFORMIN (GLUCOPHAGE) 500 mg, oral, 2 times daily (morning and late afternoon)    methocarbamol (ROBAXIN) 500 mg, oral, 4 times daily PRN    metoprolol succinate XL (TOPROL XL) 100 mg, oral, Daily, Do not crush or chew.    Mounjaro 5 mg, subcutaneous, Weekly    olmesartan-hydrochlorothiazide (BENIcar HCT) 40-25 mg tablet 1 tablet, oral, Daily    tadalafil (CIALIS) 5 mg, oral, Daily      Vitals  BP Readings from Last 2 Encounters:   04/21/25 137/75   01/16/25 161/77     BMI Readings from Last 1 Encounters: " "  04/21/25 42.86 kg/m²      Labs  A1C  Lab Results   Component Value Date    HGBA1C 6.5 04/21/2025    HGBA1C 6.5 (H) 01/15/2025    HGBA1C 5.1 08/05/2024     BMP  Lab Results   Component Value Date    CALCIUM 9.3 01/15/2025     01/15/2025    K 3.8 01/15/2025    CO2 30 01/15/2025     01/15/2025    BUN 26 (H) 01/15/2025    CREATININE 0.97 01/15/2025    EGFR 87 01/15/2025     LFTs  Lab Results   Component Value Date    ALT 18 01/15/2025    AST 17 01/15/2025    ALKPHOS 97 01/15/2025    BILITOT 0.6 01/15/2025     FLP  Lab Results   Component Value Date    TRIG 84 01/15/2025    CHOL 133 01/15/2025    LDLF 96 10/01/2022    LDLCALC 72 01/15/2025    HDL 44.5 01/15/2025     Urine Microalbumin  No results found for: \"MICROALBCREA\"    Weight Management  Wt Readings from Last 3 Encounters:   04/21/25 132 kg (290 lb 3.2 oz)   03/27/25 127 kg (280 lb)   01/16/25 130 kg (286 lb 12.8 oz)     Estimated body mass index is 42.86 kg/m² as calculated from the following:    Height as of 4/21/25: 1.753 m (5' 9\").    Weight as of 4/21/25: 132 kg (290 lb 3.2 oz).     Goal Weight: 200 lbs     Patient Reported Home Weights:   05/26/25: 277.2 lbs   06/25/25: 273.6 lbs       Assessment/Plan   Problem List Items Addressed This Visit       Type 2 diabetes mellitus without complication, without long-term current use of insulin - Primary    Patient's goal A1c is < 7%.  Is pt at goal? Yes, most recent A1c was 6.5% on 04/21/25 which had remained stable from 6.5% on 01/15/25.  Patient is not checking his blood sugar at home.     Rationale for plan:   Patient's A1c is well controlled and has been consistently for a little while  He has continued the starting dose of Mounjaro but has noticed his weight loss slow down. Over the past couple weeks reports losing ~1/2 lb per week.    Starting weight: 290.2 lbs on 04/21/25  Current weight: 273.6 lbs  Has lost 16.6 lbs since starting Mounjaro (~5.7% of total body weight)  Has lost 13 lbs over the " past 3-4 weeks (~4.5% of total body weight)  Has lost 3.6 lbs over the past 4 weeks   Due to weight loss slowing and increased appetite at the end of the week after his dose of Mounjaro, will plan to titrate up to 5 mg     Medication Changes:  CONTINUE  Metformin 500 mg 1 tablet by mouth twice daily  INCREASE  Mounjaro 5 mg under the skin once weekly (increased from 2.5 mg)  Will give last 2.5 mg dose tonight   Will increase up to 5 mg effective with dose on 07/02    Future Considerations:  If patient tolerates the 5 mg dose of Mounjaro well could titrate up to the 7.5 mg dose if needed for additional weight loss  If patient is losing significant weight on the 5 mg dose will likely keep him there until weight loss slows     Monitoring and Education:  We will assess for any new/worsening side effects at next follow-up to ensure he is tolerating dose increase well. Informed him that it is possible side effects may worsen with dose increase, however, since he has done well so far, hopefully anything new is mild and manageable.   Will obtain updated home weights to assess efficacy of therapy for weight loss  Next A1c due anytime after 10/21/25 (due every 6 months if previous well controlled)     We will follow-up in 4 weeks to see how patient has done with the increased dose of Mounjaro. He was encouraged to reach out with any questions and/or concerns prior to then.         Relevant Medications    tirzepatide (Mounjaro) 5 mg/0.5 mL pen injector    Other Relevant Orders    Referral to Clinical Pharmacy     Pharmacy Follow-Up: 07/23/2025  PCP Follow-Up: 07/23/2025    Continue all meds under the continuation of care with the referring provider and clinical pharmacy team.    Thank you,    Meir Macdonald, PharmD   Clinical Pharmacist    Verbal consent to manage patient's drug therapy was obtained from the patient. They were informed they may decline to participate or withdraw from participation in pharmacy services at  any time.          [1] No Known Allergies

## 2025-06-25 NOTE — ASSESSMENT & PLAN NOTE
Patient's goal A1c is < 7%.  Is pt at goal? Yes, most recent A1c was 6.5% on 04/21/25 which had remained stable from 6.5% on 01/15/25.  Patient is not checking his blood sugar at home.     Rationale for plan:   Patient's A1c is well controlled and has been consistently for a little while  He has continued the starting dose of Mounjaro but has noticed his weight loss slow down. Over the past couple weeks reports losing ~1/2 lb per week.    Starting weight: 290.2 lbs on 04/21/25  Current weight: 273.6 lbs  Has lost 16.6 lbs since starting Mounjaro (~5.7% of total body weight)  Has lost 13 lbs over the past 3-4 weeks (~4.5% of total body weight)  Has lost 3.6 lbs over the past 4 weeks   Due to weight loss slowing and increased appetite at the end of the week after his dose of Mounjaro, will plan to titrate up to 5 mg     Medication Changes:  CONTINUE  Metformin 500 mg 1 tablet by mouth twice daily  INCREASE  Mounjaro 5 mg under the skin once weekly (increased from 2.5 mg)  Will give last 2.5 mg dose tonight   Will increase up to 5 mg effective with dose on 07/02    Future Considerations:  If patient tolerates the 5 mg dose of Mounjaro well could titrate up to the 7.5 mg dose if needed for additional weight loss  If patient is losing significant weight on the 5 mg dose will likely keep him there until weight loss slows     Monitoring and Education:  We will assess for any new/worsening side effects at next follow-up to ensure he is tolerating dose increase well. Informed him that it is possible side effects may worsen with dose increase, however, since he has done well so far, hopefully anything new is mild and manageable.   Will obtain updated home weights to assess efficacy of therapy for weight loss  Next A1c due anytime after 10/21/25 (due every 6 months if previous well controlled)     We will follow-up in 4 weeks to see how patient has done with the increased dose of Mounjaro. He was encouraged to reach out with  any questions and/or concerns prior to then.

## 2025-07-22 NOTE — PROGRESS NOTES
"  Clinical Pharmacy Appointment    Patient ID: Omar Gold \"Juan J\" is a 65 y.o. male who presents for Weight Management and Diabetes.    Pt is here for Follow Up appointment.     Referring Provider: Na Estevez MD  PCP: Na Estevez MD  Last visit with PCP: 04/21/2025   Next visit with PCP: 07/23/2025    Subjective     Drug Interactions  No relevant drug interactions were noted.    Medication System Management  Adherence/Organization: typically remembers to take his medications but may take them late; does use a pill box to help with organization   Affordability/Accessibility: no issues reported with current medications     Patient's preferred pharmacy:     [id number] [address]  [address] [facility] [address]  Phone: [phone] Fax: [phone]    [facility]acy #[id number] - [place], [place] - [address] [address] AT [address] [address]  Phone: [phone] Fax: [phone]    [facility] [place], [place] - [address]  [address]  [facility] [address]  Phone: [phone] Fax: [phone]    [facility] [address] [place], [place] - [address]  [address] [facility] [address]  Phone: [phone] Fax: [phone]    [facility]  [address] [place] [address], [address]  [facility] [address]  Phone: [phone] Fax: [phone]     At last office visit with Dr. Estevez, patient's A1c had remained stable at 6.5%, as it was previously 6.5% ~3 months ago in January. He was interested in trying a GLP-1 agonist and was referred to the pharmacy team to discuss this and options available.     At last pharmacy encounter he was tolerating the 2.5 mg dose of Mounjaro well but had seen weight loss slowing down. He was titrated up to the 5 mg once weekly dose. We are following-up today to see how he has done with the dose increase.     HPI  DIABETES MELLITUS TYPE 2:  "   Diagnosed with diabetes: summer 2022 per patient.   Known diabetic complications: obesity.  Does patient follow with Endocrinology: No  Last optometry exam: has been a while, has an appointment scheduled next week  Most recent visit in Podiatry: does not follow with podiatry -- patient denies sores or cuts on feet today      Current diabetic medications include:  Metformin 500 mg twice daily with meals   Mounjaro 5 mg once weekly (Wednesdays)  Has 1 pen left at home for dose tonight    Historical diabetic medications include:   None     Adverse Effects:    Still has some heartburn and mild constipation occasionally, has Miralax at home but has not needed to use yet    Does note increased satiety with the increased dose, consistent typically throughout the week     Glucose Readings:  Patient does not check blood sugar at home     Any episodes of hypoglycemia? Yes, occasionally may have symptoms of blood sugar being too long.  Did patient treat episode of hypoglycemia appropriately? Yes, drank regular pop    Lifestyle:  Diet: 3-4 meals/day.   No changes reported today   Today notes eating smaller portion sizes and generally making healthier eating choices   Breakfast: typically does not eat breakfast and does not have a snack in the morning  Lunch: a couple boiled eggs, nutrigrain bar, protein shake   In between lunch and dinner may have a snack - rice cakes, jerky type meat snack   Dinner: meat (grilled chicken, ground chicken or beef patties) and vegetables (broccoli, mushrooms, carrots), sometimes some rice, potatoes, or pasta   Typically does not eat anything after dinner  On the weekends diet is different - will typically go out to eat Friday night and Saturday sometimes; admits to overeating on the weekends and eating less healthy   Drinks: typically water, occasionally a protein shake; may use crystal light or electrolyte mixes with water (usually sugar free); has regular Coke occasionally (~2-3 cans per week  "or out at dinner)  Exercise:   Strength training 3 days per week  Walking is slightly reduced, is on his feet all day at work   Is cutting his grass a few times a week and gets walking in with that   Wants to get back to walking more consistently   Is limited by: notes pain in his hip after sitting for a while, does think that more stretching will help   Tobacco history: never smoked     Secondary Prevention:  Statin? Yes, Atorvastatin 20 mg daily   ACE-I/ARB? Yes, Olmesartan-hydrochlorothiazide 40-25 mg once daily   Aspirin? No    Pertinent PMH Review:  PMH of Pancreatitis: No  PMH of Retinopathy: No  PMH of Urinary Tract Infections: No  PMH of MTC: No  PMH of MEN2: No  UACR/EGFR in last year?: No UACR, Yes EGFR  No results found for: \"MICROALBCREA\"  EGFR 87 mL/min/1.73m2 on 01/15/25    Immunizations:  Influenza? 10/30/23  COVID? 05/22/21  Pneumonia? PCV20 01/16/25  Shingles? 01/16/25, due for 2nd dose   Tdap? 01/16/25      Objective   Allergies[1]  Social History     Social History Narrative    Not on file     Medication Reconciliation:  No changes reported by patient today      Medication Review  Current Outpatient Medications   Medication Instructions    apixaban (ELIQUIS) 5 mg, oral, 2 times daily    ascorbic acid (Vitamin C) 500 mg tablet 2 tablets, Daily    atorvastatin (LIPITOR) 20 mg, oral, Daily    finasteride (PROSCAR) 5 mg, oral, Daily, Do not crush, chew, or split.    metFORMIN (GLUCOPHAGE) 500 mg, oral, 2 times daily (morning and late afternoon)    methocarbamol (ROBAXIN) 500 mg, oral, 4 times daily PRN    metoprolol succinate XL (TOPROL XL) 100 mg, oral, Daily, Do not crush or chew.    Mounjaro 5 mg, subcutaneous, Weekly    olmesartan-hydrochlorothiazide (BENIcar HCT) 40-25 mg tablet 1 tablet, oral, Daily    tadalafil (CIALIS) 5 mg, oral, Daily      Vitals  BP Readings from Last 2 Encounters:   04/21/25 137/75   01/16/25 161/77     BMI Readings from Last 1 Encounters:   04/21/25 42.86 kg/m²    " "  Labs  A1C  Lab Results   Component Value Date    HGBA1C 6.5 04/21/2025    HGBA1C 6.5 (H) 01/15/2025    HGBA1C 5.1 08/05/2024     BMP  Lab Results   Component Value Date    CALCIUM 9.3 01/15/2025     01/15/2025    K 3.8 01/15/2025    CO2 30 01/15/2025     01/15/2025    BUN 26 (H) 01/15/2025    CREATININE 0.97 01/15/2025    EGFR 87 01/15/2025     LFTs  Lab Results   Component Value Date    ALT 18 01/15/2025    AST 17 01/15/2025    ALKPHOS 97 01/15/2025    BILITOT 0.6 01/15/2025     FLP  Lab Results   Component Value Date    TRIG 84 01/15/2025    CHOL 133 01/15/2025    LDLF 96 10/01/2022    LDLCALC 72 01/15/2025    HDL 44.5 01/15/2025     Urine Microalbumin  No results found for: \"MICROALBCREA\"    Weight Management  Wt Readings from Last 3 Encounters:   04/21/25 132 kg (290 lb 3.2 oz)   03/27/25 127 kg (280 lb)   01/16/25 130 kg (286 lb 12.8 oz)     Estimated body mass index is 42.86 kg/m² as calculated from the following:    Height as of 4/21/25: 1.753 m (5' 9\").    Weight as of 4/21/25: 132 kg (290 lb 3.2 oz).     Goal Weight: 200 lbs     Patient Reported Home Weights:   05/26/25: 277.2 lbs   06/25/25: 273.6 lbs   07/23/25: 263.6 lbs       Assessment/Plan   Problem List Items Addressed This Visit       Type 2 diabetes mellitus without complication, without long-term current use of insulin - Primary    Patient's goal A1c is < 7%.  Is pt at goal? Yes, most recent A1c was 6.5% on 04/21/25 which had remained stable from 6.5% on 01/15/25.  Patient is not checking his blood sugar at home.     Rationale for plan:   Patient's A1c is well controlled and has been consistently for a little while  He has tolerated the increased dose well, still has some heartburn and mild constipation but has been manageable.   Starting weight: 290.2 lbs on 04/21/25  Current weight: 263.6 lbs  Has lost 26.6 lbs since starting Mounjaro (~9.1% of total body weight)  Has lost 10 lbs over the past 4 weeks (~2.5 lbs per week " average)  Since patient is tolerating the Zepbound well with significant weight loss, will continue on the 5 mg once weekly dose for another month     Medication Changes:  CONTINUE  Metformin 500 mg 1 tablet by mouth twice daily  Mounjaro 5 mg under the skin once weekly     Future Considerations:  If patient continues to tolerate the 5 mg dose of Mounjaro well and sees weight loss slow over the next month, could titrate up further to 7.5 mg  If patient is continuing to see significant weight loss on the 5 mg dose will likely keep him there until weight loss slows     Monitoring and Education:  We will assess for any new/worsening side effects at next follow-up to ensure he is continuing to tolerate the Zepbound well   Will obtain updated home weights to assess efficacy of therapy for weight loss  Next A1c due anytime after 10/21/25 (due every 6 months if previous well controlled)     We will follow-up in 4 weeks to see how patient has done continuing with current dose of Zepbound. He was encouraged to reach out with any questions and/or concerns prior to then.         Relevant Orders    Referral to Clinical Pharmacy     Pharmacy Follow-Up: 08/20/2025   PCP Follow-Up: 07/23/2025    Continue all meds under the continuation of care with the referring provider and clinical pharmacy team.    Thank you,    Meir Macdonald, PharmD   Clinical Pharmacist    Verbal consent to manage patient's drug therapy was obtained from the patient. They were informed they may decline to participate or withdraw from participation in pharmacy services at any time.          [1] No Known Allergies

## 2025-07-23 ENCOUNTER — TELEMEDICINE (OUTPATIENT)
Dept: PHARMACY | Facility: HOSPITAL | Age: 65
End: 2025-07-23
Payer: COMMERCIAL

## 2025-07-23 ENCOUNTER — APPOINTMENT (OUTPATIENT)
Dept: PRIMARY CARE | Facility: CLINIC | Age: 65
End: 2025-07-23
Payer: COMMERCIAL

## 2025-07-23 VITALS
OXYGEN SATURATION: 95 % | HEART RATE: 66 BPM | SYSTOLIC BLOOD PRESSURE: 128 MMHG | TEMPERATURE: 97 F | DIASTOLIC BLOOD PRESSURE: 73 MMHG | WEIGHT: 266 LBS | BODY MASS INDEX: 39.4 KG/M2 | HEIGHT: 69 IN

## 2025-07-23 DIAGNOSIS — E66.1 CLASS 2 DRUG-INDUCED OBESITY WITH SERIOUS COMORBIDITY AND BODY MASS INDEX (BMI) OF 39.0 TO 39.9 IN ADULT: ICD-10-CM

## 2025-07-23 DIAGNOSIS — E11.9 TYPE 2 DIABETES MELLITUS WITHOUT COMPLICATION, WITHOUT LONG-TERM CURRENT USE OF INSULIN: Primary | ICD-10-CM

## 2025-07-23 DIAGNOSIS — I10 BENIGN ESSENTIAL HYPERTENSION: ICD-10-CM

## 2025-07-23 DIAGNOSIS — E66.812 CLASS 2 DRUG-INDUCED OBESITY WITH SERIOUS COMORBIDITY AND BODY MASS INDEX (BMI) OF 39.0 TO 39.9 IN ADULT: ICD-10-CM

## 2025-07-23 LAB — POC HEMOGLOBIN A1C: 5.9 % (ref 4.2–6.5)

## 2025-07-23 PROCEDURE — 3044F HG A1C LEVEL LT 7.0%: CPT | Performed by: INTERNAL MEDICINE

## 2025-07-23 PROCEDURE — 1159F MED LIST DOCD IN RCRD: CPT | Performed by: INTERNAL MEDICINE

## 2025-07-23 PROCEDURE — 99214 OFFICE O/P EST MOD 30 MIN: CPT | Performed by: INTERNAL MEDICINE

## 2025-07-23 PROCEDURE — 3078F DIAST BP <80 MM HG: CPT | Performed by: INTERNAL MEDICINE

## 2025-07-23 PROCEDURE — 83036 HEMOGLOBIN GLYCOSYLATED A1C: CPT | Performed by: INTERNAL MEDICINE

## 2025-07-23 PROCEDURE — 3074F SYST BP LT 130 MM HG: CPT | Performed by: INTERNAL MEDICINE

## 2025-07-23 PROCEDURE — 1160F RVW MEDS BY RX/DR IN RCRD: CPT | Performed by: INTERNAL MEDICINE

## 2025-07-23 PROCEDURE — 3008F BODY MASS INDEX DOCD: CPT | Performed by: INTERNAL MEDICINE

## 2025-07-23 RX ORDER — METOPROLOL SUCCINATE 100 MG/1
100 TABLET, EXTENDED RELEASE ORAL DAILY
Qty: 90 TABLET | Refills: 3 | Status: SHIPPED | OUTPATIENT
Start: 2025-07-23 | End: 2026-07-23

## 2025-07-23 NOTE — PROGRESS NOTES
"Subjective   Patient ID: Omar Gold \"Juan J\" is a 65 y.o. male who presents for Follow-up.  HPI  History of Present Illness  The patient is a 65-year-old male who comes in today for follow-up.    Weight Management  - Reports an improvement in his condition, attributing it to dietary changes made since starting Mounjaro.  - Has been on a low dose of Mounjaro, which has effectively managed his cravings.  - Has lost 24 pounds over the past 12 weeks and has noticed a decrease in his pant size.  - Engages in strength training exercises and spends most of his day on his feet at work.  - Walks for 45 minutes while mowing his lawn.  - Believes that increasing his walking distance would further aid in weight loss.    Upcoming Travel  - Considering purchasing compression socks for an upcoming flight in September 2025.    Cardiopulmonary Symptoms  - Reports no chest pain, palpitations, or shortness of breath.    Urology Follow-Up  - Has not consulted a urologist since his last visit here, with his most recent appointment being in October 2024.    Ophthalmology Follow-Up  - Has been seeing an ophthalmologist for his diabetes and has an upcoming appointment next mon       Review of Systems   Constitutional:  Negative for activity change and appetite change.   Respiratory:  Negative for cough and shortness of breath.    Cardiovascular:  Negative for chest pain, palpitations and leg swelling.     Objective   Visit Vitals  /73   Pulse 66   Temp 36.1 °C (97 °F)   Ht 1.753 m (5' 9\")   Wt 121 kg (266 lb)   SpO2 95%   BMI 39.28 kg/m²   Smoking Status Never   BSA 2.43 m²      Patient Health Questionnaire-2 Score: 0 (7/23/2025  4:18 PM)     Physical Exam  Vitals and nursing note reviewed.   Cardiovascular:      Rate and Rhythm: Normal rate and regular rhythm.   Pulmonary:      Effort: Pulmonary effort is normal.      Breath sounds: Normal breath sounds.     Results  - Labs:    - A1c (07/23/2025): 5.9     Assessment & Plan  1. " Diabetes Mellitus  - A1c level at 5.9, indicating good control of diabetes  - Lost 24 pounds over the past 12 weeks; BMI decreased from 43 to 39  - Continue current regimen of Mounjaro 5 mg and maintain dietary changes  - Encourage increased physical activity, particularly walking, to aid in further weight loss and overall health improvement    2.  Weight loss  Applauded success    3.  Hypertension  Stable  4.  BPH  Reminded to schedule with urology  5.  DVT  On chronic Eliquis    - Urine sample to be collected today for further analysis    Follow-up  - Patient to follow up in 3 months     1. Type 2 diabetes mellitus without complication, without long-term current use of insulin  POCT glycosylated hemoglobin (Hb A1C) manually resulted    Albumin-Creatinine Ratio, Urine Random    Albumin-Creatinine Ratio, Urine Random      2. Benign essential hypertension  metoprolol succinate XL (Toprol XL) 100 mg 24 hr tablet      3. Class 2 drug-induced obesity with serious comorbidity and body mass index (BMI) of 39.0 to 39.9 in adult            Na Estevez MD   This medical note was created with the assistance of artificial intelligence (AI) for documentation purposes. The content has been reviewed and confirmed by the healthcare provider for accuracy and completeness. Patient consented to the use of audio recording and use of AI during their visit.

## 2025-07-23 NOTE — ASSESSMENT & PLAN NOTE
Patient's goal A1c is < 7%.  Is pt at goal? Yes, most recent A1c was 6.5% on 04/21/25 which had remained stable from 6.5% on 01/15/25.  Patient is not checking his blood sugar at home.     Rationale for plan:   Patient's A1c is well controlled and has been consistently for a little while  He has tolerated the increased dose well, still has some heartburn and mild constipation but has been manageable.   Starting weight: 290.2 lbs on 04/21/25  Current weight: 263.6 lbs  Has lost 26.6 lbs since starting Mounjaro (~9.1% of total body weight)  Has lost 10 lbs over the past 4 weeks (~2.5 lbs per week average)  Since patient is tolerating the Zepbound well with significant weight loss, will continue on the 5 mg once weekly dose for another month     Medication Changes:  CONTINUE  Metformin 500 mg 1 tablet by mouth twice daily  Mounjaro 5 mg under the skin once weekly     Future Considerations:  If patient continues to tolerate the 5 mg dose of Mounjaro well and sees weight loss slow over the next month, could titrate up further to 7.5 mg  If patient is continuing to see significant weight loss on the 5 mg dose will likely keep him there until weight loss slows     Monitoring and Education:  We will assess for any new/worsening side effects at next follow-up to ensure he is continuing to tolerate the Zepbound well   Will obtain updated home weights to assess efficacy of therapy for weight loss  Next A1c due anytime after 10/21/25 (due every 6 months if previous well controlled)     We will follow-up in 4 weeks to see how patient has done continuing with current dose of Zepbound. He was encouraged to reach out with any questions and/or concerns prior to then.

## 2025-07-28 DIAGNOSIS — E11.9 TYPE 2 DIABETES MELLITUS WITHOUT COMPLICATION, WITHOUT LONG-TERM CURRENT USE OF INSULIN: ICD-10-CM

## 2025-07-28 PROCEDURE — RXMED WILLOW AMBULATORY MEDICATION CHARGE

## 2025-07-28 RX ORDER — TIRZEPATIDE 5 MG/.5ML
5 INJECTION, SOLUTION SUBCUTANEOUS WEEKLY
Qty: 2 ML | Refills: 0 | Status: SHIPPED | OUTPATIENT
Start: 2025-07-28

## 2025-07-28 NOTE — PROGRESS NOTES
Patient requests Mounjaro refill over to [facility] as he will be over that way today to .

## 2025-07-29 ENCOUNTER — PHARMACY VISIT (OUTPATIENT)
Dept: PHARMACY | Facility: CLINIC | Age: 65
End: 2025-07-29
Payer: MEDICARE

## 2025-07-30 LAB
ALBUMIN/CREAT UR: 12 MG/G CREAT
CREAT UR-MCNC: 52 MG/DL (ref 20–320)
MICROALBUMIN UR-MCNC: 0.6 MG/DL

## 2025-08-07 ENCOUNTER — APPOINTMENT (OUTPATIENT)
Dept: OPHTHALMOLOGY | Facility: CLINIC | Age: 65
End: 2025-08-07
Payer: COMMERCIAL

## 2025-08-07 DIAGNOSIS — D48.5 NEOPLASM OF UNCERTAIN BEHAVIOR OF SKIN OF EYELID: Primary | ICD-10-CM

## 2025-08-07 RX ORDER — ERYTHROMYCIN 5 MG/G
OINTMENT OPHTHALMIC
Qty: 3.5 G | Refills: 1 | Status: SHIPPED | OUTPATIENT
Start: 2025-08-07

## 2025-08-07 ASSESSMENT — ENCOUNTER SYMPTOMS
MUSCULOSKELETAL NEGATIVE: 0
PSYCHIATRIC NEGATIVE: 0
CARDIOVASCULAR NEGATIVE: 0
ENDOCRINE NEGATIVE: 1
GASTROINTESTINAL NEGATIVE: 0
CONSTITUTIONAL NEGATIVE: 0
HEMATOLOGIC/LYMPHATIC NEGATIVE: 0
ALLERGIC/IMMUNOLOGIC NEGATIVE: 0
NEUROLOGICAL NEGATIVE: 0
EYES NEGATIVE: 1
RESPIRATORY NEGATIVE: 0

## 2025-08-07 ASSESSMENT — SLIT LAMP EXAM - LIDS: COMMENTS: BLEPHARITIS

## 2025-08-07 ASSESSMENT — VISUAL ACUITY
OS_SC: 20/25
METHOD: SNELLEN - LINEAR
OD_SC: 20/25

## 2025-08-07 ASSESSMENT — TONOMETRY
OS_IOP_MMHG: 16
IOP_METHOD: GOLDMANN APPLANATION
OD_IOP_MMHG: 17

## 2025-08-07 ASSESSMENT — EXTERNAL EXAM - RIGHT EYE: OD_EXAM: NORMAL

## 2025-08-07 NOTE — PROGRESS NOTES
"64 y/o male presenting for evaluation of left upper eyelid lesion.    Lesion has been present for the past 7 years, states the lesion \"popped up one day\"  Lesion has not grown in size  Has not had any prior periorbital lesions removed  Denies pain, discharge, bleeding  Does get irritation from lesion  Current ocular regimen: artificial tears occasionally   Currently on eliquis for DVT/hx of PE, took dose this morning  Interested in excision for cosmetic purposes    Assessment/Plan  Lesion on the LEFT UPPER eyelid. DDx includes verruca, cutaneous horn, benign cystic lesion, xanthelasma, syringoma, nevus, seborrheic keratosis, verruca, acrochordon, molluscum, chalazion, or malignancy. Given growth, appearance, and symptoms, recommended excisional biopsy. Patient is aware that there is no guarantee of result. Risks of recurrence, scarring, bleeding, infection, lash loss, and notching explained. Patient expressed understanding and wishes to proceed with the recommended procedure. Informed consent obtained and photos taken (see Media tab or below).     Procedure performed successfully in office today and tolerated well with no complications. Postprocedural instructions and return precautions reviewed. Pt will RTC in 1-2 weeks for reassessment and review of pathology. Sooner with any concerns or acute changes.              "

## 2025-08-08 ASSESSMENT — EXTERNAL EXAM - LEFT EYE: OS_EXAM: NORMAL

## 2025-08-12 DIAGNOSIS — R30.0 DYSURIA: ICD-10-CM

## 2025-08-14 ENCOUNTER — TELEPHONE (OUTPATIENT)
Dept: UROLOGY | Facility: CLINIC | Age: 65
End: 2025-08-14
Payer: COMMERCIAL

## 2025-08-14 LAB
APPEARANCE UR: CLEAR
BACTERIA #/AREA URNS HPF: ABNORMAL /HPF
BACTERIA UR CULT: NORMAL
BILIRUB UR QL STRIP: NEGATIVE
COLOR UR: YELLOW
GLUCOSE UR QL STRIP: NEGATIVE
HGB UR QL STRIP: ABNORMAL
HYALINE CASTS #/AREA URNS LPF: ABNORMAL /LPF
KETONES UR QL STRIP: NEGATIVE
LABORATORY COMMENT REPORT: NORMAL
LEUKOCYTE ESTERASE UR QL STRIP: ABNORMAL
NITRITE UR QL STRIP: NEGATIVE
PATH REPORT.FINAL DX SPEC: NORMAL
PATH REPORT.GROSS SPEC: NORMAL
PATH REPORT.RELEVANT HX SPEC: NORMAL
PATH REPORT.TOTAL CANCER: NORMAL
PH UR STRIP: 5.5 [PH] (ref 5–8)
PROT UR QL STRIP: NEGATIVE
RBC #/AREA URNS HPF: ABNORMAL /HPF
SERVICE CMNT-IMP: ABNORMAL
SP GR UR STRIP: 1.01 (ref 1–1.03)
SQUAMOUS #/AREA URNS HPF: ABNORMAL /HPF
WBC #/AREA URNS HPF: ABNORMAL /HPF

## 2025-08-20 ENCOUNTER — APPOINTMENT (OUTPATIENT)
Dept: PHARMACY | Facility: HOSPITAL | Age: 65
End: 2025-08-20
Payer: COMMERCIAL

## 2025-08-20 DIAGNOSIS — E11.9 TYPE 2 DIABETES MELLITUS WITHOUT COMPLICATION, WITHOUT LONG-TERM CURRENT USE OF INSULIN: Primary | ICD-10-CM

## 2025-08-20 RX ORDER — TIRZEPATIDE 5 MG/.5ML
5 INJECTION, SOLUTION SUBCUTANEOUS WEEKLY
Qty: 2 ML | Refills: 0 | Status: SHIPPED | OUTPATIENT
Start: 2025-08-20

## 2025-09-04 ENCOUNTER — APPOINTMENT (OUTPATIENT)
Dept: OPHTHALMOLOGY | Facility: CLINIC | Age: 65
End: 2025-09-04
Payer: COMMERCIAL

## 2025-09-04 ASSESSMENT — VISUAL ACUITY
METHOD: SNELLEN - LINEAR
OD_SC: 20/20
OS_SC: 20/25
OD_SC+: -3

## 2025-09-04 ASSESSMENT — EXTERNAL EXAM - RIGHT EYE: OD_EXAM: NORMAL

## 2025-09-04 ASSESSMENT — ENCOUNTER SYMPTOMS
GASTROINTESTINAL NEGATIVE: 0
ALLERGIC/IMMUNOLOGIC NEGATIVE: 0
PSYCHIATRIC NEGATIVE: 0
CARDIOVASCULAR NEGATIVE: 0
ENDOCRINE NEGATIVE: 0
MUSCULOSKELETAL NEGATIVE: 0
HEMATOLOGIC/LYMPHATIC NEGATIVE: 0
NEUROLOGICAL NEGATIVE: 0
CONSTITUTIONAL NEGATIVE: 0
RESPIRATORY NEGATIVE: 0
EYES NEGATIVE: 1

## 2025-09-04 ASSESSMENT — TONOMETRY
IOP_METHOD: GOLDMANN APPLANATION
OD_IOP_MMHG: 19
OS_IOP_MMHG: 19

## 2025-09-04 ASSESSMENT — EXTERNAL EXAM - LEFT EYE: OS_EXAM: NORMAL

## 2025-09-04 ASSESSMENT — SLIT LAMP EXAM - LIDS: COMMENTS: BLEPHARITIS

## 2025-09-17 ENCOUNTER — APPOINTMENT (OUTPATIENT)
Dept: PHARMACY | Facility: HOSPITAL | Age: 65
End: 2025-09-17
Payer: COMMERCIAL

## 2025-10-09 ENCOUNTER — APPOINTMENT (OUTPATIENT)
Dept: UROLOGY | Facility: CLINIC | Age: 65
End: 2025-10-09
Payer: COMMERCIAL

## 2025-10-29 ENCOUNTER — APPOINTMENT (OUTPATIENT)
Dept: PRIMARY CARE | Facility: CLINIC | Age: 65
End: 2025-10-29
Payer: COMMERCIAL

## 2026-01-19 ENCOUNTER — APPOINTMENT (OUTPATIENT)
Dept: PRIMARY CARE | Facility: CLINIC | Age: 66
End: 2026-01-19
Payer: COMMERCIAL

## 2026-05-12 ENCOUNTER — APPOINTMENT (OUTPATIENT)
Dept: OPHTHALMOLOGY | Facility: CLINIC | Age: 66
End: 2026-05-12
Payer: COMMERCIAL